# Patient Record
Sex: FEMALE | Race: WHITE | NOT HISPANIC OR LATINO | Employment: FULL TIME | URBAN - METROPOLITAN AREA
[De-identification: names, ages, dates, MRNs, and addresses within clinical notes are randomized per-mention and may not be internally consistent; named-entity substitution may affect disease eponyms.]

---

## 2017-08-03 ENCOUNTER — HOSPITAL ENCOUNTER (OUTPATIENT)
Dept: FAMILY MEDICINE CLINIC | Facility: CLINIC | Age: 22
Setting detail: SPECIMEN
Discharge: HOME | End: 2017-08-03
Attending: FAMILY MEDICINE | Admitting: FAMILY MEDICINE

## 2017-08-03 LAB
ALBUMIN SERPL-MCNC: 4.2 G/DL (ref 3.5–4.8)
ALBUMIN/GLOB SERPL: 1.4 {RATIO} (ref 1–1.7)
ALP SERPL-CCNC: 51 IU/L (ref 32–91)
ALT SERPL-CCNC: 20 IU/L (ref 14–54)
ANION GAP SERPL CALC-SCNC: 13.7 MMOL/L (ref 10–20)
AST SERPL-CCNC: 19 IU/L (ref 15–41)
BACTERIA SPEC AEROBE CULT: NORMAL
BASOPHILS # BLD AUTO: 0.1 10*3/UL (ref 0–0.2)
BASOPHILS NFR BLD AUTO: 1 % (ref 0–2)
BILIRUB SERPL-MCNC: 0.7 MG/DL (ref 0.3–1.2)
BILIRUB UR QL STRIP: NEGATIVE MG/DL
BUN SERPL-MCNC: 13 MG/DL (ref 8–20)
BUN/CREAT SERPL: 14.4 (ref 5.4–26.2)
CALCIUM SERPL-MCNC: 9.5 MG/DL (ref 8.9–10.3)
CASTS URNS QL MICRO: ABNORMAL /[LPF]
CHLORIDE SERPL-SCNC: 105 MMOL/L (ref 101–111)
COLOR UR: YELLOW
CONV BACTERIA IN URINE MICRO: ABNORMAL
CONV CLARITY OF URINE: ABNORMAL
CONV CO2: 23 MMOL/L (ref 22–32)
CONV HYALINE CASTS IN URINE MICRO: 2 /[LPF] (ref 0–5)
CONV PROTEIN IN URINE BY AUTOMATED TEST STRIP: NEGATIVE MG/DL
CONV SMALL ROUND CELLS: ABNORMAL /[HPF]
CONV TOTAL PROTEIN: 7.3 G/DL (ref 6.1–7.9)
CONV UROBILINOGEN IN URINE BY AUTOMATED TEST STRIP: 0.2 MG/DL
CREAT UR-MCNC: 0.9 MG/DL (ref 0.4–1)
CULTURE INDICATED?: ABNORMAL
DIFFERENTIAL METHOD BLD: (no result)
EOSINOPHIL # BLD AUTO: 0.2 10*3/UL (ref 0–0.3)
EOSINOPHIL # BLD AUTO: 2 % (ref 0–3)
ERYTHROCYTE [DISTWIDTH] IN BLOOD BY AUTOMATED COUNT: 13.7 % (ref 11.5–14.5)
GLOBULIN UR ELPH-MCNC: 3.1 G/DL (ref 2.5–3.8)
GLUCOSE SERPL-MCNC: 86 MG/DL (ref 65–99)
GLUCOSE UR QL: NEGATIVE MG/DL
HCT VFR BLD AUTO: 40.3 % (ref 35–49)
HGB BLD-MCNC: 13.5 G/DL (ref 12–15)
HGB UR QL STRIP: NEGATIVE
KETONES UR QL STRIP: NEGATIVE MG/DL
LEUKOCYTE ESTERASE UR QL STRIP: NEGATIVE
LYMPHOCYTES # BLD AUTO: 1.8 10*3/UL (ref 0.8–4.8)
LYMPHOCYTES NFR BLD AUTO: 25 % (ref 18–42)
Lab: NORMAL
MCH RBC QN AUTO: 29.7 PG (ref 26–32)
MCHC RBC AUTO-ENTMCNC: 33.6 G/DL (ref 32–36)
MCV RBC AUTO: 88.5 FL (ref 80–94)
MICRO REPORT STATUS: NORMAL
MONOCYTES # BLD AUTO: 0.8 10*3/UL (ref 0.1–1.3)
MONOCYTES NFR BLD AUTO: 10 % (ref 2–11)
NEUTROPHILS # BLD AUTO: 4.6 10*3/UL (ref 2.3–8.6)
NEUTROPHILS NFR BLD AUTO: 62 % (ref 50–75)
NITRITE UR QL STRIP: NEGATIVE
NRBC BLD AUTO-RTO: 0 /100{WBCS}
NRBC/RBC NFR BLD MANUAL: 0 10*3/UL
PH UR STRIP.AUTO: 6 [PH] (ref 4.5–8)
PLATELET # BLD AUTO: 238 10*3/UL (ref 150–450)
PMV BLD AUTO: 9.6 FL (ref 7.4–10.4)
POTASSIUM SERPL-SCNC: 3.7 MMOL/L (ref 3.6–5.1)
RBC # BLD AUTO: 4.55 10*6/UL (ref 4–5.4)
RBC #/AREA URNS HPF: 2 /[HPF] (ref 0–3)
SODIUM SERPL-SCNC: 138 MMOL/L (ref 136–144)
SP GR UR: 1.03 (ref 1–1.03)
SPECIMEN SOURCE: NORMAL
SPERM URNS QL MICRO: ABNORMAL /[HPF]
SQUAMOUS SPT QL MICRO: 2 /[HPF] (ref 0–5)
TSH SERPL-ACNC: 3.56 UIU/ML (ref 0.34–5.6)
UNIDENT CRYS URNS QL MICRO: ABNORMAL /[HPF]
WBC # BLD AUTO: 7.4 10*3/UL (ref 4.5–11.5)
WBC #/AREA URNS HPF: 2 /[HPF] (ref 0–5)
YEAST SPEC QL WET PREP: ABNORMAL /[HPF]

## 2019-05-10 ENCOUNTER — HOSPITAL ENCOUNTER (OUTPATIENT)
Dept: FAMILY MEDICINE CLINIC | Facility: CLINIC | Age: 24
Setting detail: SPECIMEN
Discharge: HOME | End: 2019-05-10
Attending: FAMILY MEDICINE | Admitting: FAMILY MEDICINE

## 2019-05-10 LAB
ALBUMIN SERPL-MCNC: 4 G/DL (ref 3.5–4.8)
ALBUMIN/GLOB SERPL: 1.2 {RATIO} (ref 1–1.7)
ALP SERPL-CCNC: 50 IU/L (ref 32–91)
ALT SERPL-CCNC: 22 IU/L (ref 14–54)
AMPICILLIN SUSC ISLT: NORMAL
ANION GAP SERPL CALC-SCNC: 12.8 MMOL/L (ref 10–20)
AST SERPL-CCNC: 21 IU/L (ref 15–41)
AZTREONAM SUSC ISLT: NORMAL
BACTERIA ISLT: NORMAL
BACTERIA SPEC AEROBE CULT: NORMAL
BASOPHILS # BLD AUTO: 0 10*3/UL (ref 0–0.2)
BASOPHILS NFR BLD AUTO: 1 % (ref 0–2)
BILIRUB SERPL-MCNC: 0.7 MG/DL (ref 0.3–1.2)
BILIRUB UR QL STRIP: NEGATIVE MG/DL
BUN SERPL-MCNC: 9 MG/DL (ref 8–20)
BUN/CREAT SERPL: 11.3 (ref 5.4–26.2)
CALCIUM SERPL-MCNC: 9 MG/DL (ref 8.9–10.3)
CASTS URNS QL MICRO: ABNORMAL /[LPF]
CEFAZOLIN SUSC ISLT: NORMAL
CEFEPIME SUSC ISLT: NORMAL
CEFTAZIDIME SUSC ISLT: NORMAL
CEFTRIAXONE SUSC ISLT: NORMAL
CHLORIDE SERPL-SCNC: 107 MMOL/L (ref 101–111)
CHOLEST SERPL-MCNC: 139 MG/DL
CHOLEST/HDLC SERPL: 5.2 {RATIO}
CIPROFLOXACIN SUSC ISLT: NORMAL
COLONY COUNT: NORMAL
COLOR UR: YELLOW
CONV BACTERIA IN URINE MICRO: ABNORMAL
CONV CLARITY OF URINE: ABNORMAL
CONV CO2: 22 MMOL/L (ref 22–32)
CONV HYALINE CASTS IN URINE MICRO: ABNORMAL /[LPF] (ref 0–5)
CONV LDL CHOLESTEROL DIRECT: 103 MG/DL (ref 0–100)
CONV PROTEIN IN URINE BY AUTOMATED TEST STRIP: NEGATIVE MG/DL
CONV SMALL ROUND CELLS: ABNORMAL /[HPF]
CONV TOTAL PROTEIN: 7.4 G/DL (ref 6.1–7.9)
CONV UROBILINOGEN IN URINE BY AUTOMATED TEST STRIP: 0.2 MG/DL
CREAT UR-MCNC: 0.8 MG/DL (ref 0.4–1)
CULTURE INDICATED?: ABNORMAL
DIFFERENTIAL METHOD BLD: (no result)
EOSINOPHIL # BLD AUTO: 0.1 10*3/UL (ref 0–0.3)
EOSINOPHIL # BLD AUTO: 2 % (ref 0–3)
ERTAPENEM SUSC ISLT: NORMAL
ERYTHROCYTE [DISTWIDTH] IN BLOOD BY AUTOMATED COUNT: 14.2 % (ref 11.5–14.5)
ERYTHROCYTE [SEDIMENTATION RATE] IN BLOOD BY WESTERGREN METHOD: 13 MM/HR (ref 0–20)
GLOBULIN UR ELPH-MCNC: 3.4 G/DL (ref 2.5–3.8)
GLUCOSE SERPL-MCNC: 85 MG/DL (ref 65–99)
GLUCOSE UR QL: NEGATIVE MG/DL
HCT VFR BLD AUTO: 40.8 % (ref 35–49)
HDLC SERPL-MCNC: 27 MG/DL
HGB BLD-MCNC: 13.7 G/DL (ref 12–15)
HGB UR QL STRIP: ABNORMAL
KETONES UR QL STRIP: NEGATIVE MG/DL
LDLC/HDLC SERPL: 3.8 {RATIO}
LEUKOCYTE ESTERASE UR QL STRIP: NEGATIVE
LEVOFLOXACIN SUSC ISLT: NORMAL
LIPID INTERPRETATION: ABNORMAL
LYMPHOCYTES # BLD AUTO: 1.3 10*3/UL (ref 0.8–4.8)
LYMPHOCYTES NFR BLD AUTO: 23 % (ref 18–42)
Lab: NORMAL
MCH RBC QN AUTO: 29.4 PG (ref 26–32)
MCHC RBC AUTO-ENTMCNC: 33.4 G/DL (ref 32–36)
MCV RBC AUTO: 87.9 FL (ref 80–94)
MEROPENEM SUSC ISLT: NORMAL
MICRO REPORT STATUS: NORMAL
MONOCYTES # BLD AUTO: 0.5 10*3/UL (ref 0.1–1.3)
MONOCYTES NFR BLD AUTO: 10 % (ref 2–11)
NEUTROPHILS # BLD AUTO: 3.7 10*3/UL (ref 2.3–8.6)
NEUTROPHILS NFR BLD AUTO: 64 % (ref 50–75)
NITRITE UR QL STRIP: POSITIVE
NITROFURANTOIN SUSC ISLT: NORMAL
NRBC BLD AUTO-RTO: 0 /100{WBCS}
NRBC/RBC NFR BLD MANUAL: 0 10*3/UL
PH UR STRIP.AUTO: 6 [PH] (ref 4.5–8)
PIP+TAZO SUSC ISLT: NORMAL
PLATELET # BLD AUTO: 276 10*3/UL (ref 150–450)
PMV BLD AUTO: 9.2 FL (ref 7.4–10.4)
POTASSIUM SERPL-SCNC: 3.8 MMOL/L (ref 3.6–5.1)
RBC # BLD AUTO: 4.65 10*6/UL (ref 4–5.4)
RBC #/AREA URNS HPF: 6 /[HPF] (ref 0–3)
SODIUM SERPL-SCNC: 138 MMOL/L (ref 136–144)
SP GR UR: 1.03 (ref 1–1.03)
SPECIMEN SOURCE: NORMAL
SPERM URNS QL MICRO: ABNORMAL /[HPF]
SQUAMOUS SPT QL MICRO: 25 /[HPF] (ref 0–5)
SUSC METH SPEC: NORMAL
TETRACYCLINE SUSC ISLT: NORMAL
TOBRAMYCIN SUSC ISLT: NORMAL
TRIGL SERPL-MCNC: 106 MG/DL
TRIMETHOPRIM/SULFA: NORMAL
TSH SERPL-ACNC: 3.15 UIU/ML (ref 0.34–5.6)
UNIDENT CRYS URNS QL MICRO: ABNORMAL /[HPF]
VIT B12 SERPL-MCNC: 220 PG/ML (ref 180–914)
VLDLC SERPL CALC-MCNC: 9.2 MG/DL
WBC # BLD AUTO: 5.7 10*3/UL (ref 4.5–11.5)
WBC #/AREA URNS HPF: 10 /[HPF] (ref 0–5)
YEAST SPEC QL WET PREP: ABNORMAL /[HPF]

## 2019-05-13 LAB
ANA SER QL IA: NEGATIVE
HBA1C MFR BLD: 4.5 % (ref 0–5.6)

## 2019-06-26 RX ORDER — ALPRAZOLAM 2 MG/1
TABLET ORAL
Qty: 60 TABLET | Refills: 0 | Status: SHIPPED | OUTPATIENT
Start: 2019-06-26 | End: 2019-07-30 | Stop reason: SDUPTHER

## 2019-07-02 ENCOUNTER — TELEPHONE (OUTPATIENT)
Dept: FAMILY MEDICINE CLINIC | Facility: CLINIC | Age: 24
End: 2019-07-02

## 2019-07-02 NOTE — TELEPHONE ENCOUNTER
Patient left  stating that she had nerve test done last Friday, and is asking what the next step should be.

## 2019-07-05 ENCOUNTER — TELEPHONE (OUTPATIENT)
Dept: FAMILY MEDICINE CLINIC | Facility: CLINIC | Age: 24
End: 2019-07-05

## 2019-07-08 ENCOUNTER — TELEPHONE (OUTPATIENT)
Dept: FAMILY MEDICINE CLINIC | Facility: CLINIC | Age: 24
End: 2019-07-08

## 2019-07-08 NOTE — TELEPHONE ENCOUNTER
Patient called stating that she is needing cortisone shots in both hands. When you like for me to schedule. Is 7/19 @ 2:00 ok? Please advise

## 2019-07-19 ENCOUNTER — OFFICE VISIT (OUTPATIENT)
Dept: FAMILY MEDICINE CLINIC | Facility: CLINIC | Age: 24
End: 2019-07-19

## 2019-07-19 VITALS
WEIGHT: 293 LBS | BODY MASS INDEX: 53.92 KG/M2 | DIASTOLIC BLOOD PRESSURE: 108 MMHG | TEMPERATURE: 98.3 F | HEIGHT: 62 IN | SYSTOLIC BLOOD PRESSURE: 138 MMHG | HEART RATE: 75 BPM

## 2019-07-19 DIAGNOSIS — G56.02 CARPAL TUNNEL SYNDROME OF LEFT WRIST: ICD-10-CM

## 2019-07-19 DIAGNOSIS — G56.03 CARPAL TUNNEL SYNDROME, BILATERAL: Primary | ICD-10-CM

## 2019-07-19 DIAGNOSIS — G56.01 CARPAL TUNNEL SYNDROME ON RIGHT: ICD-10-CM

## 2019-07-19 PROCEDURE — 20526 THER INJECTION CARP TUNNEL: CPT | Performed by: FAMILY MEDICINE

## 2019-07-19 RX ORDER — ALPRAZOLAM 2 MG/1
2 TABLET ORAL EVERY 12 HOURS
COMMUNITY
Start: 2018-10-01 | End: 2019-10-25

## 2019-07-19 RX ORDER — METHYLPREDNISOLONE ACETATE 80 MG/ML
20 INJECTION, SUSPENSION INTRA-ARTICULAR; INTRALESIONAL; INTRAMUSCULAR; SOFT TISSUE ONCE
Status: COMPLETED | OUTPATIENT
Start: 2019-07-19 | End: 2019-07-19

## 2019-07-19 RX ORDER — LIDOCAINE HYDROCHLORIDE 10 MG/ML
1 INJECTION, SOLUTION INFILTRATION; PERINEURAL ONCE
Status: COMPLETED | OUTPATIENT
Start: 2019-07-19 | End: 2019-07-19

## 2019-07-19 RX ORDER — ALBUTEROL SULFATE 90 UG/1
AEROSOL, METERED RESPIRATORY (INHALATION)
COMMUNITY
Start: 2015-06-11 | End: 2020-09-15

## 2019-07-19 RX ORDER — METHYLPREDNISOLONE ACETATE 40 MG/ML
20 INJECTION, SUSPENSION INTRA-ARTICULAR; INTRALESIONAL; INTRAMUSCULAR; SOFT TISSUE ONCE
Status: COMPLETED | OUTPATIENT
Start: 2019-07-19 | End: 2019-07-19

## 2019-07-19 RX ORDER — PHENTERMINE HYDROCHLORIDE 15 MG/1
30 CAPSULE ORAL EVERY MORNING
COMMUNITY
End: 2020-11-20

## 2019-07-19 RX ADMIN — LIDOCAINE HYDROCHLORIDE 1 ML: 10 INJECTION, SOLUTION INFILTRATION; PERINEURAL at 15:27

## 2019-07-19 RX ADMIN — METHYLPREDNISOLONE ACETATE 20 MG: 40 INJECTION, SUSPENSION INTRA-ARTICULAR; INTRALESIONAL; INTRAMUSCULAR; SOFT TISSUE at 14:50

## 2019-07-19 RX ADMIN — METHYLPREDNISOLONE ACETATE 20 MG: 40 INJECTION, SUSPENSION INTRA-ARTICULAR; INTRALESIONAL; INTRAMUSCULAR; SOFT TISSUE at 15:38

## 2019-07-19 RX ADMIN — LIDOCAINE HYDROCHLORIDE 1 ML: 10 INJECTION, SOLUTION INFILTRATION; PERINEURAL at 15:26

## 2019-07-19 RX ADMIN — METHYLPREDNISOLONE ACETATE 20 MG: 80 INJECTION, SUSPENSION INTRA-ARTICULAR; INTRALESIONAL; INTRAMUSCULAR; SOFT TISSUE at 15:30

## 2019-07-19 NOTE — PROGRESS NOTES
"Rooming Tab(CC,VS,Pt Hx,Fall Screen)  Chief Complaint   Patient presents with   • cortisone injections     both hands       Subjective   Bilateral carpal tunnel.  Verified by [facility].  Here today to try injections.           I have reviewed and updated her medications, medical history and problem list during today's office visit.     Patient Care Team:  Peyman Moulton MD as PCP - General  Provider, No Known as PCP - Family Medicine    Problem List Tab  Medications Tab  Synopsis Tab  Chart Review Tab  Care Everywhere Tab  Immunizations Tab  Patient History Tab    Social History     Tobacco Use   • Smoking status: Never Smoker   • Smokeless tobacco: Never Used   Substance Use Topics   • Alcohol use: No     Frequency: Never       Review of Systems   Constitutional: Negative.  Negative for appetite change, diaphoresis, fatigue, fever and unexpected weight loss.   HENT: Negative for congestion, sinus pressure and sore throat.    Eyes: Negative for blurred vision, double vision and itching.   Respiratory: Negative for cough and shortness of breath.    Cardiovascular: Negative for chest pain and palpitations.   Genitourinary: Negative for urinary incontinence, difficulty urinating and frequency.   Musculoskeletal: Positive for arthralgias and myalgias. Negative for back pain, joint swelling and neck pain.   Skin: Negative for dry skin and rash.   Allergic/Immunologic: Negative for environmental allergies.   Neurological: Positive for numbness. Negative for dizziness, tremors, syncope, headache and memory problem.   Hematological: Does not bruise/bleed easily.   Psychiatric/Behavioral: Negative for depressed mood. The patient is not nervous/anxious.    All other systems reviewed and are negative.      Objective     Rooming Tab(CC,VS,Pt Hx,Fall Screen)  BP (!) 138/108 (BP Location: Right arm, Patient Position: Sitting, Cuff Size: Adult)   Pulse 75   Temp 98.3 °F (36.8 °C) (Oral)   Ht 157.5 cm (62\")   Wt (!) 140 kg " (309 lb 6.4 oz)   LMP 06/30/2019   BMI 56.59 kg/m²     Body mass index is 56.59 kg/m².    Physical Exam   Constitutional: She appears well-developed and well-nourished.   HENT:   Right Ear: External ear normal.   Left Ear: External ear normal.   Nose: Nose normal.   Mouth/Throat: Oropharynx is clear and moist. No oropharyngeal exudate.   Eyes: Conjunctivae and EOM are normal. Pupils are equal, round, and reactive to light. Right eye exhibits no discharge. Left eye exhibits no discharge. No scleral icterus.   Neck: Normal range of motion. Neck supple. No JVD present. No thyromegaly present.   Cardiovascular: Normal rate, regular rhythm, normal heart sounds and intact distal pulses.   No murmur heard.  Pulmonary/Chest: Breath sounds normal. No respiratory distress. She has no wheezes. She has no rales. She exhibits no tenderness.   Abdominal: Soft. Bowel sounds are normal. She exhibits no distension. There is no tenderness.   Genitourinary: Rectal exam shows guaiac negative stool.   Musculoskeletal: Normal range of motion. She exhibits no edema, tenderness or deformity.   Carpal tunnel syndrome bilat   Lymphadenopathy:     She has no cervical adenopathy.        Statin Choice Calculator  Data Reviewed:  Arthrocentesis  Date/Time: 7/19/2019 2:50 PM  Performed by: Peyman Moulton MD  Authorized by: Peyman Moulton MD   Indications: pain   Body area: wrist  Joint: right wrist  Local anesthesia used: yes  Anesthesia: local infiltration    Anesthesia:  Local anesthesia used: yes  Local Anesthetic: lidocaine 1% without epinephrine    Sedation:  Patient sedated: no    Preparation: Patient was prepped and draped in the usual sterile fashion.  Needle size: 22 G  Ultrasound guidance: no  Approach: anterior  Patient tolerance: Patient tolerated the procedure well with no immediate complications  Comments: Injected the carpal tunnel with 20mg of depomedrol.      Arthrocentesis  Date/Time: 7/23/2019 9:23 PM  Performed by:  Peyman Moulton MD  Authorized by: Peyman Moulton MD   Indications: pain   Body area: wrist  Joint: left wrist  Local anesthesia used: yes  Anesthesia: local infiltration    Anesthesia:  Local anesthesia used: yes  Local Anesthetic: lidocaine 1% without epinephrine    Sedation:  Patient sedated: no    Preparation: Patient was prepped and draped in the usual sterile fashion.  Needle size: 22 G  Ultrasound guidance: no  Approach: anterior                   Lab Results   Component Value Date    BUN 9 05/10/2019    CREATININE 0.8 05/10/2019     05/10/2019    K 3.8 05/10/2019     05/10/2019    CALCIUM 9.0 05/10/2019    ALBUMIN 4.0 05/10/2019    BILITOT 0.7 05/10/2019    ALKPHOS 50 05/10/2019    AST 21 05/10/2019    ALT 22 05/10/2019    TRIG 106 05/10/2019    HDL 27 (L) 05/10/2019     (H) 05/10/2019    LDLHDL 3.8 05/10/2019    WBC 5.7 05/10/2019    RBC 4.65 05/10/2019    HCT 40.8 05/10/2019    MCV 87.9 05/10/2019    MCH 29.4 05/10/2019    TSH 3.15 05/10/2019      Assessment/Plan   Order Review Tab  Health Maintenance Tab  Patient Plan/Order Tab  Diagnoses and all orders for this visit:    1. Carpal tunnel syndrome, bilateral (Primary)  Assessment & Plan:  Injected both wrists with 40mg of depomedrol    Orders:  -     Arthrocentesis  -     methylPREDNISolone acetate (DEPO-medrol) injection 20 mg  -     methylPREDNISolone acetate (DEPO-medrol) injection 20 mg  -     lidocaine (XYLOCAINE) 1 % injection 1 mL  -     lidocaine (XYLOCAINE) 1 % injection 1 mL  -     Arthrocentesis    2. Carpal tunnel syndrome on right  -     Arthrocentesis  -     Arthrocentesis    3. Carpal tunnel syndrome of left wrist  -     Arthrocentesis  -     Arthrocentesis      Wrapup Tab  Return in about 4 months (around 11/19/2019).

## 2019-07-20 PROBLEM — E11.9 TYPE 2 DIABETES MELLITUS (HCC): Status: ACTIVE | Noted: 2019-07-20

## 2019-07-20 PROBLEM — G56.03 CARPAL TUNNEL SYNDROME, BILATERAL UPPER LIMBS: Status: ACTIVE | Noted: 2019-05-10

## 2019-07-20 PROBLEM — G56.02 CARPAL TUNNEL SYNDROME OF LEFT WRIST: Status: ACTIVE | Noted: 2019-07-20

## 2019-07-23 RX ADMIN — METHYLPREDNISOLONE ACETATE 20 MG: 40 INJECTION, SUSPENSION INTRA-ARTICULAR; INTRALESIONAL; INTRAMUSCULAR; SOFT TISSUE at 21:23

## 2019-07-24 RX ORDER — METHYLPREDNISOLONE ACETATE 40 MG/ML
20 INJECTION, SUSPENSION INTRA-ARTICULAR; INTRALESIONAL; INTRAMUSCULAR; SOFT TISSUE
Status: DISCONTINUED | OUTPATIENT
Start: 2019-07-19 | End: 2019-07-24 | Stop reason: HOSPADM

## 2019-07-24 RX ORDER — METHYLPREDNISOLONE ACETATE 40 MG/ML
20 INJECTION, SUSPENSION INTRA-ARTICULAR; INTRALESIONAL; INTRAMUSCULAR; SOFT TISSUE
Status: DISCONTINUED | OUTPATIENT
Start: 2019-07-23 | End: 2019-07-24 | Stop reason: HOSPADM

## 2019-07-26 ENCOUNTER — TELEPHONE (OUTPATIENT)
Dept: FAMILY MEDICINE CLINIC | Facility: CLINIC | Age: 24
End: 2019-07-26

## 2019-07-26 RX ORDER — GABAPENTIN 300 MG/1
CAPSULE ORAL
Qty: 100 CAPSULE | Refills: 2 | Status: SHIPPED | OUTPATIENT
Start: 2019-07-26 | End: 2019-10-25

## 2019-07-30 RX ORDER — ALPRAZOLAM 2 MG/1
TABLET ORAL
Qty: 60 TABLET | Refills: 0 | Status: SHIPPED | OUTPATIENT
Start: 2019-07-30 | End: 2019-08-23 | Stop reason: SDUPTHER

## 2019-08-06 ENCOUNTER — TELEPHONE (OUTPATIENT)
Dept: FAMILY MEDICINE CLINIC | Facility: CLINIC | Age: 24
End: 2019-08-06

## 2019-08-06 NOTE — TELEPHONE ENCOUNTER
Darlene has gabapentin 300mg      She need to increase the dose to 300mg AM 300mg q afternoon and 900mg at HS  Every 2-3 days add in a 300mg dose to the morning, then the afternoon and then the night dose until she is taking 9 caps or pills a day.    Also schedule her to be seen by Alexsandra for her CTS.   Bilateral.

## 2019-08-06 NOTE — TELEPHONE ENCOUNTER
Patient left vm stating that she has been taking gabapentin that was prescribed for hand. Patient states that it is not helping, and is asking what you would suggest.     Also, patient states that she is scheduled for appt on 8/16. Patient is asking if you are needing to see her or can hand issues just be taken care of over the phone.

## 2019-08-09 DIAGNOSIS — G56.03 BILATERAL CARPAL TUNNEL SYNDROME: Primary | ICD-10-CM

## 2019-08-24 RX ORDER — ALPRAZOLAM 2 MG/1
TABLET ORAL
Qty: 60 TABLET | Refills: 0 | Status: SHIPPED | OUTPATIENT
Start: 2019-08-24 | End: 2019-09-23 | Stop reason: SDUPTHER

## 2019-09-19 ENCOUNTER — TELEPHONE (OUTPATIENT)
Dept: FAMILY MEDICINE CLINIC | Facility: CLINIC | Age: 24
End: 2019-09-19

## 2019-09-19 RX ORDER — GUAIFENESIN AND CODEINE PHOSPHATE 100; 10 MG/5ML; MG/5ML
5 SOLUTION ORAL 4 TIMES DAILY PRN
Qty: 180 ML | Refills: 1 | Status: SHIPPED | OUTPATIENT
Start: 2019-09-19 | End: 2019-10-25

## 2019-09-19 RX ORDER — AZITHROMYCIN 500 MG/1
500 TABLET, FILM COATED ORAL DAILY
Qty: 5 TABLET | Refills: 0 | Status: SHIPPED | OUTPATIENT
Start: 2019-09-19 | End: 2019-09-24

## 2019-09-19 NOTE — TELEPHONE ENCOUNTER
PATIENT LEFT VM @ 8:47 AM WITH COMPLAINT OF COLD LIKE SYMP. PATIENT HAS TRIED MUCINEX, DAYQUIL AND SUDAFED WITH NO RELIEF. PATIENT IS ASKING FOR A ZPAK AND COUGH MEDICATION. PLEASE ADVISE.

## 2019-09-23 RX ORDER — ALPRAZOLAM 2 MG/1
TABLET ORAL
Qty: 60 TABLET | Refills: 0 | Status: SHIPPED | OUTPATIENT
Start: 2019-09-23 | End: 2019-11-04 | Stop reason: SDUPTHER

## 2019-10-15 ENCOUNTER — TELEPHONE (OUTPATIENT)
Dept: FAMILY MEDICINE CLINIC | Facility: CLINIC | Age: 24
End: 2019-10-15

## 2019-10-25 ENCOUNTER — OFFICE VISIT (OUTPATIENT)
Dept: FAMILY MEDICINE CLINIC | Facility: CLINIC | Age: 24
End: 2019-10-25

## 2019-10-25 VITALS
HEART RATE: 87 BPM | WEIGHT: 293 LBS | OXYGEN SATURATION: 99 % | BODY MASS INDEX: 53.92 KG/M2 | SYSTOLIC BLOOD PRESSURE: 130 MMHG | RESPIRATION RATE: 16 BRPM | HEIGHT: 62 IN | TEMPERATURE: 98.5 F | DIASTOLIC BLOOD PRESSURE: 90 MMHG

## 2019-10-25 DIAGNOSIS — R55 SYNCOPE, NON CARDIAC: ICD-10-CM

## 2019-10-25 DIAGNOSIS — E11.649 TYPE 2 DIABETES MELLITUS WITH HYPOGLYCEMIA WITHOUT COMA, WITHOUT LONG-TERM CURRENT USE OF INSULIN (HCC): Primary | ICD-10-CM

## 2019-10-25 DIAGNOSIS — E16.1 HYPERINSULINEMIA: ICD-10-CM

## 2019-10-25 DIAGNOSIS — E66.01 CLASS 3 SEVERE OBESITY DUE TO EXCESS CALORIES WITH SERIOUS COMORBIDITY AND BODY MASS INDEX (BMI) OF 50.0 TO 59.9 IN ADULT (HCC): ICD-10-CM

## 2019-10-25 DIAGNOSIS — I10 ESSENTIAL HYPERTENSION: ICD-10-CM

## 2019-10-25 DIAGNOSIS — E16.1 REACTIVE HYPOGLYCEMIA: ICD-10-CM

## 2019-10-25 PROCEDURE — 99214 OFFICE O/P EST MOD 30 MIN: CPT | Performed by: FAMILY MEDICINE

## 2019-10-25 RX ORDER — LEVOTHYROXINE SODIUM 0.05 MG/1
50 TABLET ORAL DAILY
Refills: 1 | COMMUNITY
Start: 2019-09-12 | End: 2020-05-21

## 2019-11-04 RX ORDER — ALPRAZOLAM 2 MG/1
TABLET ORAL
Qty: 60 TABLET | Refills: 0 | Status: SHIPPED | OUTPATIENT
Start: 2019-11-04 | End: 2019-12-16 | Stop reason: SDUPTHER

## 2019-12-16 RX ORDER — ALPRAZOLAM 2 MG/1
TABLET ORAL
Qty: 60 TABLET | Refills: 3 | Status: SHIPPED | OUTPATIENT
Start: 2019-12-16 | End: 2020-04-15

## 2020-02-23 PROBLEM — I10 HTN (HYPERTENSION): Status: ACTIVE | Noted: 2020-02-23

## 2020-02-23 PROBLEM — E16.1 REACTIVE HYPOGLYCEMIA: Status: ACTIVE | Noted: 2020-02-23

## 2020-02-23 PROBLEM — R55 SYNCOPE, NON CARDIAC: Status: ACTIVE | Noted: 2020-02-23

## 2020-02-23 NOTE — ASSESSMENT & PLAN NOTE
Hypertension is unchanged.  Continue current treatment regimen.  Dietary sodium restriction.  Weight loss.  Regular aerobic exercise.  Stop smoking.  Blood pressure will be reassessed in 3 months.    Just like her diabetes I think her hypertension has all been weight related.  Rubi is never needed very much medication to control her blood pressure.  She is currently not on anything and her blood pressure today systolic is in the 130s and diastolic is in the high 80s.  Outside the office I am confident that she is completely normal.  Like all of her medical problems Rubi needs to lose weight and they will probably go away.

## 2020-02-23 NOTE — ASSESSMENT & PLAN NOTE
Currently is massively obese with a BMI of 56.  She is hyperinsulinemic and I think that is the main culprit behind her worrisome symptoms of syncope sweating episodic lethargy and her episodes of changes in mentation.  She needs to get in the habit of eating small frequent high-fiber meals and snacks.  She has to exercise.  Suggested weight watchers.  She may be a candidate for bariatric surgery in the near future

## 2020-02-23 NOTE — PATIENT INSTRUCTIONS
Hypoglycemia  Hypoglycemia occurs when the level of sugar (glucose) in the blood is too low. Hypoglycemia can happen in people who do or do not have diabetes. It can develop quickly, and it can be a medical emergency. For most people with diabetes, a blood glucose level below 70 mg/dL (3.9 mmol/L) is considered hypoglycemia.  Glucose is a type of sugar that provides the body's main source of energy. Certain hormones (insulin and glucagon) control the level of glucose in the blood. Insulin lowers blood glucose, and glucagon raises blood glucose. Hypoglycemia can result from having too much insulin in the bloodstream, or from not eating enough food that contains glucose. You may also have reactive hypoglycemia, which happens within 4 hours after eating a meal.  What are the causes?  Hypoglycemia occurs most often in people who have diabetes and may be caused by:  · Diabetes medicine.  · Not eating enough, or not eating often enough.  · Increased physical activity.  · Drinking alcohol on an empty stomach.  If you do not have diabetes, hypoglycemia may be caused by:  · A tumor in the pancreas.  · Not eating enough, or not eating for long periods at a time (fasting).  · A severe infection or illness.  · Certain medicines.  What increases the risk?  Hypoglycemia is more likely to develop in:  · People who have diabetes and take medicines to lower blood glucose.  · People who abuse alcohol.  · People who have a severe illness.  What are the signs or symptoms?  Mild symptoms  Mild hypoglycemia may not cause any symptoms. If you do have symptoms, they may include:  · Hunger.  · Anxiety.  · Sweating and feeling clammy.  · Dizziness or feeling light-headed.  · Sleepiness.  · Nausea.  · Increased heart rate.  · Headache.  · Blurry vision.  · Irritability.  · Tingling or numbness around the mouth, lips, or tongue.  · A change in coordination.  · Restless sleep.  Moderate symptoms  Moderate hypoglycemia can cause:  · Mental  confusion and poor judgment.  · Behavior changes.  · Weakness.  · Irregular heartbeat.  Severe symptoms  Severe hypoglycemia is a medical emergency. It can cause:  · Fainting.  · Seizures.  · Loss of consciousness (coma).  · Death.  How is this diagnosed?  Hypoglycemia is diagnosed with a blood test to measure your blood glucose level. This blood test is done while you are having symptoms. Your health care provider may also do a physical exam and review your medical history.  How is this treated?  This condition can often be treated by immediately eating or drinking something that contains sugar, such as:  · Fruit juice, 4-6 oz (120-150 mL).  · Regular soda (not diet soda), 4-6 oz (120-150 mL).  · Low-fat milk, 4 oz (120 mL).  · Several pieces of hard candy.  · Sugar or honey, 1 Tbsp (15 mL).  Treating hypoglycemia if you have diabetes  If you are alert and able to swallow safely, follow the 15:15 rule:  · Take 15 grams of a rapid-acting carbohydrate. Talk with your health care provider about how much you should take.  · Rapid-acting options include:  ? Glucose pills (take 15 grams).  ? 6-8 pieces of hard candy.  ? 4-6 oz (120-150 mL) of fruit juice.  ? 4-6 oz (120-150 mL) of regular (not diet) soda.  ? 1 Tbsp (15 mL) honey or sugar.  · Check your blood glucose 15 minutes after you take the carbohydrate.  · If the repeat blood glucose level is still at or below 70 mg/dL (3.9 mmol/L), take 15 grams of a carbohydrate again.  · If your blood glucose level does not increase above 70 mg/dL (3.9 mmol/L) after 3 tries, seek emergency medical care.  · After your blood glucose level returns to normal, eat a meal or a snack within 1 hour.    Treating severe hypoglycemia  Severe hypoglycemia is when your blood glucose level is at or below 54 mg/dL (3 mmol/L). Severe hypoglycemia is a medical emergency. Get medical help right away.  If you have severe hypoglycemia and you cannot eat or drink, you may need an injection of  glucagon. A family member or close friend should learn how to check your blood glucose and how to give you a glucagon injection. Ask your health care provider if you need to have an emergency glucagon injection kit available.  Severe hypoglycemia may need to be treated in a hospital. The treatment may include getting glucose through an IV. You may also need treatment for the cause of your hypoglycemia.  Follow these instructions at home:    General instructions  · Take over-the-counter and prescription medicines only as told by your health care provider.  · Monitor your blood glucose as told by your health care provider.  · Limit alcohol intake to no more than 1 drink a day for nonpregnant women and 2 drinks a day for men. One drink equals 12 oz of beer (355 mL), 5 oz of wine (148 mL), or 1½ oz of hard liquor (44 mL).  · Keep all follow-up visits as told by your health care provider. This is important.  If you have diabetes:  · Always have a rapid-acting carbohydrate snack with you to treat low blood glucose.  · Follow your diabetes management plan as directed. Make sure you:  ? Know the symptoms of hypoglycemia. It is important to treat it right away to prevent it from becoming severe.  ? Take your medicines as directed.  ? Follow your exercise plan.  ? Follow your meal plan. Eat on time, and do not skip meals.  ? Check your blood glucose as often as directed. Always check before and after exercise.  ? Follow your sick day plan whenever you cannot eat or drink normally. Make this plan in advance with your health care provider.  · Share your diabetes management plan with people in your workplace, school, and household.  · Check your urine for ketones when you are ill and as told by your health care provider.  · Carry a medical alert card or wear medical alert jewelry.  Contact a health care provider if:  · You have problems keeping your blood glucose in your target range.  · You have frequent episodes of  hypoglycemia.  Get help right away if:  · You continue to have hypoglycemia symptoms after eating or drinking something containing glucose.  · Your blood glucose is at or below 54 mg/dL (3 mmol/L).  · You have a seizure.  · You faint.  These symptoms may represent a serious problem that is an emergency. Do not wait to see if the symptoms will go away. Get medical help right away. Call your local emergency services (911 in the [place]  Summary  · Hypoglycemia occurs when the level of sugar (glucose) in the blood is too low.  · Hypoglycemia can happen in people who do or do not have diabetes. It can develop quickly, and it can be a medical emergency.  · Make sure you know the symptoms of hypoglycemia and how to treat it.  · Always have a rapid-acting carbohydrate snack with you to treat low blood sugar.  This information is not intended to replace advice given to you by your health care provider. Make sure you discuss any questions you have with your health care provider.  Document Released: 12/18/2006 Document Revised: 06/11/2019 Document Reviewed: 01/20/2017  [employer] Interactive Patient Education © 2020 [employer]

## 2020-02-23 NOTE — ASSESSMENT & PLAN NOTE
I believe she is having noncardiac syncope tied in with her episodes of hypoglycemia secondary to hyperinsulinemia secondary to her obesity.  I am not sure if she has a vasovagal event at the end of this cycle or what exactly happens but her syncope seems to be tied in with what is probably hypoglycemia.  We have talked at great length about how to prevent this with small frequent high fiber low carbohydrate meals and snacks.  We also stressed the importance of losing weight

## 2020-02-23 NOTE — ASSESSMENT & PLAN NOTE
Obesity is worsening.  Discussed the patient's BMI.  The BMI is above average; BMI management plan is completed.  General weight loss/lifestyle modification strategies discussed (elicit support from others; identify saboteurs; non-food rewards, etc).  Behavioral treatment: commercial programs (Weight watchers discussed).  Diet interventions: low calorie (1000 kCal/d) deficit diet.  Informal exercise measures discussed, e.g. taking stairs instead of elevator.  Regular aerobic exercise program discussed.     Currently has a BMI of 56.52.  She has to get her weight down if she wants to live a reasonably normal life.  I have suggested she look into the weight watchers program since is been very successful recently and would fit her lifestyle.  If it is not possible for her to do this in the next few years we need to consider bariatric surgery.  I really think her medical concerns with syncope and weakness sweating are all probably related to hypoglycemia from being hyperinsulinemic.  She was diabetic but I am not sure that she really is right now.  Her last A1c was 5.4%.  I think what she has risk of is hypoglycemia.  That is what is causing her trouble.  She needs to eat low calorie high fiber snacks low in carbs throughout the day.  She can fast basically overnight but as soon as she wakes up I think it would be wise for her to have some apples and may be a slight amount of peanut butter to keep her blood sugars elevated out of the symptomatic range.  During the day she needs to eat small portions frequent meals.

## 2020-02-23 NOTE — ASSESSMENT & PLAN NOTE
This is what I think is triggering her syncope.  I think she will eat a meal or snack that has carbohydrates in it and that initiates a hyperinsulinemic state which in turn pushes her sugars down.  Sometimes this happens to such a degree that she gets very symptomatic sweats, very weak, and occasionally she will have syncope.   The whole idea is to eat small frequent high-fiber low carbohydrate snacks and meals and to lose weight as soon as possible.

## 2020-03-09 ENCOUNTER — TELEPHONE (OUTPATIENT)
Dept: FAMILY MEDICINE CLINIC | Facility: CLINIC | Age: 25
End: 2020-03-09

## 2020-03-09 NOTE — TELEPHONE ENCOUNTER
Pt stated she had surgery on her left hand on Wednesday 03/04/2020. Pt stated she was only given a 3 day RX for medication and was requesting Dr. Moulton send a RX to [facility] for pain. Pt also states she has been alternating Acetaminophen and ibuprofen 800mg yet the pain is still there. Pt would like a call back when RX is sent to [facility].         Pt can be contacted at [phone]      Pt requests a VM be left if no answer.

## 2020-03-10 NOTE — TELEPHONE ENCOUNTER
Tell Rubi that I am sorry but the surgeon has to be the one to give her pain medicine postoperatively.  If her pain is that bad she needs to be going to see the surgeon to find out what the problem is.  I cannot be giving her pain medicine for this

## 2020-03-25 ENCOUNTER — TELEPHONE (OUTPATIENT)
Dept: FAMILY MEDICINE CLINIC | Facility: CLINIC | Age: 25
End: 2020-03-25

## 2020-03-25 NOTE — TELEPHONE ENCOUNTER
HUB - PLEASE INFORM PATIENT.  This would need to be done by the surgeon.     Left message 3/25 11:27 am for pt to call back.

## 2020-03-25 NOTE — TELEPHONE ENCOUNTER
Patient called in stating she needs to have [facility] paper work filled out, she had surgery on her hand and doesn't believe she's ready to go back to work.    Please call to advise    Patient call back [phone]

## 2020-04-15 DIAGNOSIS — F41.1 GENERALIZED ANXIETY DISORDER: Primary | ICD-10-CM

## 2020-04-15 RX ORDER — ALPRAZOLAM 2 MG/1
TABLET ORAL
Qty: 60 TABLET | Refills: 3 | Status: SHIPPED | OUTPATIENT
Start: 2020-04-15 | End: 2020-08-13

## 2020-04-19 NOTE — ASSESSMENT & PLAN NOTE
Diabetes is improving with lifestyle modifications.   Continue current treatment regimen.  Reminded to bring in blood sugar diary at next visit.  Dietary recommendations for ADA diet.  Regular aerobic exercise.  Discussed ways to avoid symptomatic hypoglycemia.  Diabetes will be reassessed in 3 months.    Patient has actually had reasonably good control of her blood sugars for the past year.  Her last A1c was 5.4%.  She is not taking any medication she is doing this basically with diet.  What is happening I believe is that she is hyperinsulinemic and she eats sporadically.  In particular when she eats high carbohydrate foods she becomes hypoglycemic shortly thereafter.  This in turn has led several times to syncope.  She has to learn how to eat small frequent high-fiber low carbohydrate meals and snacks.  Her overall calorie count has to be low somewhere around 2000 avel a day.  I suggested that she consider weight watchers program.  In the long run though I think bariatric surgery is probably going to be her best option.   baseline

## 2020-04-23 ENCOUNTER — TELEPHONE (OUTPATIENT)
Dept: FAMILY MEDICINE CLINIC | Facility: CLINIC | Age: 25
End: 2020-04-23

## 2020-04-23 NOTE — TELEPHONE ENCOUNTER
No I cant do that.   They both need to go to work and its the responsibility of the employer to protect them.  If they have a union they need to talk with the union reps.   If no union they need to speak with human resource department.   If they dont help they need to call the state and ask for help.

## 2020-04-23 NOTE — TELEPHONE ENCOUNTER
Patient and her boyfriend, Chris Sandhu, both work for [employer]. There are confirmed cases of COVID-19 at their place of employment. Patient carries diagnosis of diabetes and asthma. Boyfriend lives with her. She is asking if you would authorize a leave from work for her boyfriend, who is not a patient here, because he lives with someone who has diabetes and asthma. Boyfriend's  [birth year]10[birth year]1991. Thank you.

## 2020-05-19 ENCOUNTER — TELEPHONE (OUTPATIENT)
Dept: FAMILY MEDICINE CLINIC | Facility: CLINIC | Age: 25
End: 2020-05-19

## 2020-05-19 NOTE — TELEPHONE ENCOUNTER
PATIENT CALLING BACK TO SPEAK TO GUERRERO.    I WAS UNABLE TO REACH ANYONE IN THE OFFICE.    PATIENT CAN BE REACHED -[phone]

## 2020-05-19 NOTE — TELEPHONE ENCOUNTER
I will have to use a Telehealth appointment for this and can't schedule unless I have PMJ's OK to schedule. Looking at 5/21/20 2:30 pm. Please advise.

## 2020-05-19 NOTE — TELEPHONE ENCOUNTER
Scheduled appointment 5/21/20 10:00 am PMJ (30 minutes). Pt notified of appointment date and time.

## 2020-05-21 ENCOUNTER — TELEPHONE (OUTPATIENT)
Dept: FAMILY MEDICINE CLINIC | Facility: CLINIC | Age: 25
End: 2020-05-21

## 2020-05-21 ENCOUNTER — OFFICE VISIT (OUTPATIENT)
Dept: FAMILY MEDICINE CLINIC | Facility: CLINIC | Age: 25
End: 2020-05-21

## 2020-05-21 VITALS
RESPIRATION RATE: 16 BRPM | DIASTOLIC BLOOD PRESSURE: 82 MMHG | WEIGHT: 293 LBS | HEIGHT: 62 IN | OXYGEN SATURATION: 98 % | HEART RATE: 74 BPM | SYSTOLIC BLOOD PRESSURE: 118 MMHG | BODY MASS INDEX: 53.92 KG/M2 | TEMPERATURE: 98 F

## 2020-05-21 DIAGNOSIS — E16.1 REACTIVE HYPOGLYCEMIA: Primary | ICD-10-CM

## 2020-05-21 DIAGNOSIS — E11.649 TYPE 2 DIABETES MELLITUS WITH HYPOGLYCEMIA WITHOUT COMA, WITHOUT LONG-TERM CURRENT USE OF INSULIN (HCC): ICD-10-CM

## 2020-05-21 DIAGNOSIS — E16.1 HYPERINSULINEMIA: ICD-10-CM

## 2020-05-21 DIAGNOSIS — E66.01 MORBID OBESITY WITH BMI OF 50.0-59.9, ADULT (HCC): Primary | ICD-10-CM

## 2020-05-21 DIAGNOSIS — E66.01 CLASS 3 SEVERE OBESITY DUE TO EXCESS CALORIES WITH SERIOUS COMORBIDITY AND BODY MASS INDEX (BMI) OF 50.0 TO 59.9 IN ADULT (HCC): ICD-10-CM

## 2020-05-21 PROCEDURE — 99214 OFFICE O/P EST MOD 30 MIN: CPT | Performed by: FAMILY MEDICINE

## 2020-05-21 RX ORDER — LEVOTHYROXINE SODIUM 0.07 MG/1
75 TABLET ORAL DAILY
COMMUNITY
Start: 2020-05-09

## 2020-05-21 NOTE — PROGRESS NOTES
Rooming Tab(CC,VS,Pt Hx,Fall Screen)  Chief Complaint   Patient presents with   • Diabetes       Subjective    Rubi is a 25-year-old white female who suffers with morbid obesity having a BMI of 56.7 today to the office with [place] papers that she needs filled out regarding episodes of what sounds like hypoglycemia.    I have reviewed and updated her medications, medical history and problem list during today's office visit.     Patient Care Team:  Peyman Moulton MD as PCP - General    Problem List Tab  Medications Tab  Synopsis Tab  Chart Review Tab  Care Everywhere Tab  Immunizations Tab  Patient History Tab    Social History     Tobacco Use   • Smoking status: Never Smoker   • Smokeless tobacco: Never Used   Substance Use Topics   • Alcohol use: No     Frequency: Never       Review of Systems   Constitutional: Negative for activity change, appetite change, fatigue, fever and unexpected weight loss.   HENT: Negative for congestion, ear pain, hearing loss, postnasal drip, rhinorrhea, sinus pressure, sneezing, sore throat and swollen glands.    Eyes: Negative for blurred vision.   Respiratory: Negative for cough, shortness of breath and wheezing.    Cardiovascular: Negative for chest pain.   Gastrointestinal: Negative for abdominal pain, nausea and indigestion.   Genitourinary: Negative for dysuria, urgency and urinary incontinence.   Musculoskeletal: Negative for arthralgias, back pain, joint swelling and myalgias.   Skin: Negative for dry skin and skin lesions.   Allergic/Immunologic: Negative for environmental allergies.   Neurological: Negative for dizziness, headache, memory problem and confusion.   Hematological: Negative for adenopathy.   Psychiatric/Behavioral: Negative for agitation, depressed mood and stress. The patient is not nervous/anxious.    All other systems reviewed and are negative.      Objective     Rooming Tab(CC,VS,Pt Hx,Fall Screen)  /82 (BP Location: Right arm, Patient Position:  "Sitting, Cuff Size: Large Adult)   Pulse 74   Temp 98 °F (36.7 °C) (Temporal)   Resp 16   Ht 157.5 cm (62\")   Wt (!) 141 kg (310 lb)   SpO2 98%   BMI 56.70 kg/m²     Body mass index is 56.7 kg/m².    Physical Exam   Constitutional: She is oriented to person, place, and time. She appears well-developed and well-nourished.   HENT:   Head: Normocephalic and atraumatic.   Right Ear: External ear normal.   Left Ear: External ear normal.   Nose: Nose normal.   Mouth/Throat: Oropharynx is clear and moist. No oropharyngeal exudate.   Eyes: Pupils are equal, round, and reactive to light. Conjunctivae and EOM are normal. Right eye exhibits no discharge. Left eye exhibits no discharge. No scleral icterus.   Neck: Normal range of motion. Neck supple. No JVD present. No thyromegaly present.   Cardiovascular: Normal rate, regular rhythm, normal heart sounds and intact distal pulses.   No murmur heard.  Pulmonary/Chest: Effort normal and breath sounds normal. No respiratory distress. She has no wheezes. She has no rales. She exhibits no tenderness.   Abdominal: Soft. Bowel sounds are normal. She exhibits no distension. There is no tenderness.   Genitourinary: Rectal exam shows guaiac negative stool.   Musculoskeletal: Normal range of motion. She exhibits no edema, tenderness or deformity.   Lymphadenopathy:     She has no cervical adenopathy.   Neurological: She is alert and oriented to person, place, and time.   Skin: Skin is warm and dry.   Psychiatric: She has a normal mood and affect. Her behavior is normal. Judgment and thought content normal.   Vitals reviewed.       Statin Choice Calculator  Data Reviewed:                   Assessment/Plan   Order Review Tab  Health Maintenance Tab  Patient Plan/Order Tab  Diagnoses and all orders for this visit:    1. Reactive hypoglycemia (Primary)  Assessment & Plan:  The patient is having profound syncopal spells secondary to reactive hypoglycemia.  To prevent these she needs to " eat fewer calories more often.  They should be low glycemic foods with more protein and fat.  She needs to eat 6-7 small meals a day with total calories amounting to no more than 1200.      2. Hyperinsulinemia  Assessment & Plan:  Patient has hyperinsulinemia and when she either eats the wrong type of food or waits too long between meals she has a tendency to have hypoglycemia that at times has been rather profound and very symptomatic.  Treatment is going to be low glycemic index foods with more protein and fats and less calories but I want her to spread the calories out throughout a 24.  With small frequent snacks.  Low glycemic index foods taken more frequently but overall caloric intake should be no more than 1200 per 24 hours.      3. Type 2 diabetes mellitus with hypoglycemia without coma, without long-term current use of insulin (CMS/[facility])  Assessment & Plan:  Diabetes has resolved.   Patient is on no medication and her A1c is under 5%.  She is certainly at risk for getting diabetes again if she is not careful but she is now well versed in low-carb diet, exercise ,and weight loss.  Hopefully she can maintain this nondiabetic state.  Patient has been erroneously marked as diabetic. Based on the available clinical information, she does not have diabetes and should therefore be excluded from diabetic health maintenance and quality measures for the remainder of the reporting period.            4. Class 3 severe obesity due to excess calories with serious comorbidity and body mass index (BMI) of 50.0 to 59.9 in adult (CMS/[facility])  Assessment & Plan:  Obesity is worsening.  Discussed the patient's BMI.  The BMI is above average; BMI management plan is completed.  General weight loss/lifestyle modification strategies discussed (elicit support from others; identify saboteurs; non-food rewards, etc).  Behavioral treatment: commercial programs (Weight watchers).  Diet interventions: low calorie (1000 kCal/d) deficit  diet.  Informal exercise measures discussed, e.g. taking stairs instead of elevator.  Regular aerobic exercise program discussed.       Patient is a prime candidate for bariatric surgery.  I discussed this with her and I would really like her to go ahead and start the process of at least getting evaluated.  She can always decide not to do it.        Wrapup Tab  Return in about 3 months (around 8/21/2020) for Recheck.

## 2020-05-25 NOTE — ASSESSMENT & PLAN NOTE
Obesity is worsening.  Discussed the patient's BMI.  The BMI is above average; BMI management plan is completed.  General weight loss/lifestyle modification strategies discussed (elicit support from others; identify saboteurs; non-food rewards, etc).  Behavioral treatment: commercial programs (Weight watchers).  Diet interventions: low calorie (1000 kCal/d) deficit diet.  Informal exercise measures discussed, e.g. taking stairs instead of elevator.  Regular aerobic exercise program discussed.       Patient is a prime candidate for bariatric surgery.  I discussed this with her and I would really like her to go ahead and start the process of at least getting evaluated.  She can always decide not to do it.

## 2020-05-25 NOTE — ASSESSMENT & PLAN NOTE
Patient has hyperinsulinemia and when she either eats the wrong type of food or waits too long between meals she has a tendency to have hypoglycemia that at times has been rather profound and very symptomatic.  Treatment is going to be low glycemic index foods with more protein and fats and less calories but I want her to spread the calories out throughout a 24.  With small frequent snacks.  Low glycemic index foods taken more frequently but overall caloric intake should be no more than 1200 per 24 hours.

## 2020-05-25 NOTE — ASSESSMENT & PLAN NOTE
The patient is having profound syncopal spells secondary to reactive hypoglycemia.  To prevent these she needs to eat fewer calories more often.  They should be low glycemic foods with more protein and fat.  She needs to eat 6-7 small meals a day with total calories amounting to no more than 1200.

## 2020-05-25 NOTE — ASSESSMENT & PLAN NOTE
Diabetes has resolved.   Patient is on no medication and her A1c is under 5%.  She is certainly at risk for getting diabetes again if she is not careful but she is now well versed in low-carb diet, exercise ,and weight loss.  Hopefully she can maintain this nondiabetic state.  Patient has been erroneously marked as diabetic. Based on the available clinical information, she does not have diabetes and should therefore be excluded from diabetic health maintenance and quality measures for the remainder of the reporting period.

## 2020-05-25 NOTE — PATIENT INSTRUCTIONS
Preventing Hypoglycemia  Hypoglycemia occurs when the level of sugar (glucose) in the blood is too low. Hypoglycemia can happen in people who do or do not have diabetes (diabetes mellitus). It can develop quickly, and it can be a medical emergency. For most people with diabetes, a blood glucose level below 70 mg/dL (3.9 mmol/L) is considered hypoglycemia.  Glucose is a type of sugar that provides the body's main source of energy. Certain hormones (insulin and glucagon) control the level of glucose in the blood. Insulin lowers blood glucose, and glucagon increases blood glucose. Hypoglycemia can result from having too much insulin in the bloodstream, or from not eating enough food that contains glucose.  Your risk for hypoglycemia is higher:  · If you take insulin or diabetes medicines to help lower your blood glucose or help your body make more insulin.  · If you skip or delay a meal or snack.  · If you are ill.  · During and after exercise.  You can prevent hypoglycemia by working with your health care provider to adjust your meal plan as needed and by taking other precautions.  How can hypoglycemia affect me?  Mild symptoms  Mild hypoglycemia may not cause any symptoms. If you do have symptoms, they may include:  · Hunger.  · Anxiety.  · Sweating and feeling clammy.  · Dizziness or feeling light-headed.  · Sleepiness.  · Nausea.  · Increased heart rate.  · Headache.  · Blurry vision.  · Irritability.  · Tingling or numbness around the mouth, lips, or tongue.  · A change in coordination.  · Restless sleep.  If mild hypoglycemia is not recognized and treated, it can quickly become moderate or severe hypoglycemia.  Moderate symptoms  Moderate hypoglycemia can cause:  · Mental confusion and poor judgment.  · Behavior changes.  · Weakness.  · Irregular heartbeat.  Severe symptoms  Severe hypoglycemia is a medical emergency. It can cause:  · Fainting.  · Seizures.  · Loss of consciousness (coma).  · Death.  What  nutrition changes can be made?  · Work with your health care provider or diet and nutrition specialist (dietitian) to make a healthy meal plan that is right for you. Follow your meal plan carefully.  · Eat meals at regular times.  · If recommended by your health care provider, have snacks between meals.  · Donot skip or delay meals or snacks. You can be at risk for hypoglycemia if you are not getting enough carbohydrates.  What lifestyle changes can be made?    · Work closely with your health care provider to manage your blood glucose. Make sure you know:  ? Your goal blood glucose levels.  ? How and when to check your blood glucose.  ? The symptoms of hypoglycemia. It is important to treat it right away to keep it from becoming severe.  · Do not drink alcohol on an empty stomach.  · When you are ill, check your blood glucose more often than usual. Follow your sick day plan whenever you cannot eat or drink normally. Make this plan in advance with your health care provider.  · Always check your blood glucose before, during, and after exercise.  How is this treated?  This condition can often be treated by immediately eating or drinking something that contains sugar, such as:  · Fruit juice, 4-6 oz (120-150 mL).  · Regular (not diet) soda, 4-6 oz (120-150 mL).  · Low-fat milk, 4 oz (120 mL).  · Several pieces of hard candy.  · Sugar or honey, 1 Tbsp (15 mL).  Treating hypoglycemia if you have diabetes  If you are alert and able to swallow safely, follow the 15:15 rule:  · Take 15 grams of a rapid-acting carbohydrate. Talk with your health care provider about how much you should take.  · Rapid-acting options include:  ? Glucose pills (take 15 grams).  ? 6-8 pieces of hard candy.  ? 4-6 oz (120-150 mL) of fruit juice.  ? 4-6 oz (120-150 mL) of regular (not diet) soda.  · Check your blood glucose 15 minutes after you take the carbohydrate.  · If the repeat blood glucose level is still at or below 70 mg/dL (3.9 mmol/L),  take 15 grams of a carbohydrate again.  · If your blood glucose level does not increase above 70 mg/dL (3.9 mmol/L) after 3 tries, seek emergency medical care.  · After your blood glucose level returns to normal, eat a meal or a snack within 1 hour.  Treating severe hypoglycemia  Severe hypoglycemia is when your blood glucose level is at or below 54 mg/dL (3 mmol/L). Severe hypoglycemia is a medical emergency. Get medical help right away.  If you have severe hypoglycemia and you cannot eat or drink, you may need an injection of glucagon. A family member or close friend should learn how to check your blood glucose and how to give you a glucagon injection. Ask your health care provider if you need to have an emergency glucagon injection kit available.  Severe hypoglycemia may need to be treated in a hospital. The treatment may include getting glucose through an IV. You may also need treatment for the cause of your hypoglycemia.  Where to find more information  · American Diabetes Association: www.diabetes.org  · National [place] of Diabetes and Digestive and Kidney Diseases: www.niddk.nih.gov  Contact a health care provider if:  · You have problems keeping your blood glucose in your target range.  · You have frequent episodes of hypoglycemia.  Get help right away if:  · You continue to have hypoglycemia symptoms after eating or drinking something containing glucose.  · Your blood glucose level is at or below 54 mg/dL (3 mmol/L).  · You faint.  · You have a seizure.  These symptoms may represent a serious problem that is an emergency. Do not wait to see if the symptoms will go away. Get medical help right away. Call your local emergency services (911 in the [place]  Summary  · Know the symptoms of hypoglycemia, and when you are at risk for it (such as during exercise or when you are sick). Check your blood glucose often when you are at risk for hypoglycemia.  · Hypoglycemia can develop quickly, and it can be dangerous  if it is not treated right away. If you have a history of severe hypoglycemia, make sure you know how to use your glucagon injection kit.  · Make sure you know how to treat hypoglycemia. Keep a carbohydrate snack available when you may be at risk for hypoglycemia.  This information is not intended to replace advice given to you by your health care provider. Make sure you discuss any questions you have with your health care provider.  Document Released: 08/15/2018 Document Revised: 04/10/2020 Document Reviewed: 08/15/2018  Elsevier Patient Education © 2020 [employer]

## 2020-08-12 DIAGNOSIS — F41.1 GENERALIZED ANXIETY DISORDER: ICD-10-CM

## 2020-08-13 RX ORDER — ALPRAZOLAM 2 MG/1
TABLET ORAL
Qty: 60 TABLET | Refills: 1 | Status: SHIPPED | OUTPATIENT
Start: 2020-08-13 | End: 2020-10-13

## 2020-09-18 ENCOUNTER — TELEPHONE (OUTPATIENT)
Dept: FAMILY MEDICINE CLINIC | Facility: CLINIC | Age: 25
End: 2020-09-18

## 2020-09-18 RX ORDER — ALBUTEROL SULFATE 90 UG/1
2 AEROSOL, METERED RESPIRATORY (INHALATION) EVERY 4 HOURS PRN
Qty: 8.5 G | Refills: 11 | Status: SHIPPED | OUTPATIENT
Start: 2020-09-18 | End: 2020-10-18

## 2020-09-18 RX ORDER — PREDNISONE 20 MG/1
TABLET ORAL
Qty: 25 TABLET | Refills: 1 | Status: SHIPPED | OUTPATIENT
Start: 2020-09-18

## 2020-09-18 NOTE — TELEPHONE ENCOUNTER
PATIENT IS CALLING IN REGARDS TO BEING SICK FOR A COUPLE WEEKS. PATIENT HAS BEEN GIVEN COVID19 TEST, ANTIBIOTICS, AND A STREP TEST. ALL NEGATIVE. TESTED 8/20 NEGATIVE FOR COVID19    WAS RETESTED 9/16 FOR COVID, WAITING FOR RESULTS    PATIENT SAYS COUGHING AND IS HAVING DIFFICULTY BREATHING.    PATIENT HAS USED ALMOST ALL OF HER INHALER THAT WAS REFILLED.    PATIENT IS NEEDING SOMETHING CALLED IN. SHE WAS GIVEN THE COUGH PEARLS AND IT DID NOT HELP..    PLEASE CALL PATIENT   [phone]

## 2020-09-18 NOTE — TELEPHONE ENCOUNTER
It sounds like her asthma is kicking up probably due to severe fall allergies.  Lets use prednisone to get the entire system to calm down and then Trelegy and albuterol inhalers to try and keep the system calm down.

## 2020-09-22 ENCOUNTER — TELEPHONE (OUTPATIENT)
Dept: FAMILY MEDICINE CLINIC | Facility: CLINIC | Age: 25
End: 2020-09-22

## 2020-09-22 NOTE — TELEPHONE ENCOUNTER
Patient called and stated she needs a PA for Fluticasone-Umeclidin-Vilant 100-62.5-25 MCG/INH aerosol powder     Please advise     [phone]

## 2020-09-23 NOTE — TELEPHONE ENCOUNTER
There are more choices but the problem is I could guess all night long and they just keep saying no.   Darlene needs to find out from her insurance company whats on her formulary they will cover.  Tell her to check under Advair discus 250,  Symbicort 160, Breo Elliptica, Dulera etc etc.    They should tell her what they will cover.

## 2020-09-23 NOTE — TELEPHONE ENCOUNTER
HUB TO SHARE    Left voicemail for her to check her insurance to see what is on formulary.and to let us know

## 2020-10-10 DIAGNOSIS — F41.1 GENERALIZED ANXIETY DISORDER: ICD-10-CM

## 2020-10-13 RX ORDER — ALPRAZOLAM 2 MG/1
TABLET ORAL
Qty: 60 TABLET | Refills: 1 | Status: SHIPPED | OUTPATIENT
Start: 2020-10-13 | End: 2020-12-05

## 2020-11-17 DIAGNOSIS — E66.01 CLASS 3 SEVERE OBESITY DUE TO EXCESS CALORIES WITH SERIOUS COMORBIDITY AND BODY MASS INDEX (BMI) OF 50.0 TO 59.9 IN ADULT (HCC): Primary | ICD-10-CM

## 2020-11-20 RX ORDER — PHENTERMINE HYDROCHLORIDE 37.5 MG/1
TABLET ORAL
Qty: 30 TABLET | Refills: 2 | Status: SHIPPED | OUTPATIENT
Start: 2020-11-20

## 2020-11-28 PROBLEM — E11.9 TYPE 2 DIABETES MELLITUS: Status: RESOLVED | Noted: 2019-07-20 | Resolved: 2020-11-28

## 2020-12-02 DIAGNOSIS — F41.1 GENERALIZED ANXIETY DISORDER: ICD-10-CM

## 2020-12-05 RX ORDER — ALPRAZOLAM 2 MG/1
TABLET ORAL
Qty: 60 TABLET | Refills: 1 | Status: SHIPPED | OUTPATIENT
Start: 2020-12-05 | End: 2021-02-08

## 2021-01-14 ENCOUNTER — TELEPHONE (OUTPATIENT)
Dept: BARIATRICS/WEIGHT MGMT | Facility: CLINIC | Age: 26
End: 2021-01-14

## 2021-01-25 ENCOUNTER — PREP FOR SURGERY (OUTPATIENT)
Dept: OTHER | Facility: HOSPITAL | Age: 26
End: 2021-01-25

## 2021-01-25 RX ORDER — SODIUM CHLORIDE, SODIUM LACTATE, POTASSIUM CHLORIDE, CALCIUM CHLORIDE 600; 310; 30; 20 MG/100ML; MG/100ML; MG/100ML; MG/100ML
30 INJECTION, SOLUTION INTRAVENOUS CONTINUOUS PRN
Status: CANCELLED | OUTPATIENT
Start: 2021-01-25

## 2021-01-26 ENCOUNTER — OFFICE VISIT (OUTPATIENT)
Dept: PSYCHIATRY | Facility: CLINIC | Age: 26
End: 2021-01-26

## 2021-01-26 ENCOUNTER — OFFICE VISIT (OUTPATIENT)
Dept: BARIATRICS/WEIGHT MGMT | Facility: CLINIC | Age: 26
End: 2021-01-26

## 2021-01-26 DIAGNOSIS — E66.01 MORBID OBESITY (HCC): Primary | ICD-10-CM

## 2021-01-26 DIAGNOSIS — Z01.818 PRE-OP EXAMINATION: ICD-10-CM

## 2021-01-26 DIAGNOSIS — Z01.818 PRE-OPERATIVE EXAMINATION: ICD-10-CM

## 2021-01-26 PROCEDURE — 90791 PSYCH DIAGNOSTIC EVALUATION: CPT | Performed by: PSYCHIATRY & NEUROLOGY

## 2021-01-26 PROCEDURE — 99443 PR PHYS/QHP TELEPHONE EVALUATION 21-30 MIN: CPT | Performed by: NURSE PRACTITIONER

## 2021-01-26 NOTE — PROGRESS NOTES
[facility] BAR SUR[facility]  [address] [place] [address]  [address] [place] [address]  Dept: [phone]  1/26/2021      Darlene Mendieta.  [id number]  [id number]  1995  female    You have chosen to receive care through a telephone call. Do you consent to use a telephone call for your medical care today? Yes    Current weight 288 pounds     Chief Complaint of weight gain; unable to maintain weight loss    History of Present Illness:   Darlene is a 26 y.o. female who presents today for evaluation, education and consultation regarding weight loss surgery. The patient is interested in the sleeve gastrectomy.      Diet History:See dietician/RN/MA documentation for complete history of weight and diet.     Bariatric Surgery Evaluation: The patient is being seen for an initial visit for bariatric surgery evaluation.   Works 3 rd shift  Breakfast: eats at 5 pm, bag of cauliflower with cheese and hamburger daniel with cheese   5 am after she gets off work- 2 eggs and 2 pieces of sausage   Snacks: usually none, but sometimes handful of pork rinds   Drinks: unsweet tea, decaf black tea, and diet Dr. 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   Carpal tunnel left wrist and left elbow     No Known Allergies      Current Outpatient  Medications:   •  albuterol sulfate HFA (ProAir HFA) 108 (90 Base) MCG/ACT inhaler, Inhale 2 puffs Every 4 (Four) Hours As Needed for Wheezing for up to 30 days., Disp: 8.5 g, Rfl: 11  •  ALPRAZolam (XANAX) 2 MG tablet, TAKE 1 TABLET BY MOUTH TWICE A DAY, Disp: 60 tablet, Rfl: 1  •  glucose blood (AGAMATRIX PRESTO TEST) test strip, AGAMATRIX PRESTO TEST STRP, Disp: , Rfl:   •  levothyroxine (SYNTHROID, LEVOTHROID) 75 MCG tablet, Take 75 mcg by mouth Daily. on an empty stomach, Disp: , Rfl:   •  phentermine (ADIPEX-P) 37.5 MG tablet, TAKE 1 TABLET BY MOUTH ONCE DAILY *NOT COVERED*, Disp: 30 tablet, Rfl: 2  •  predniSONE (DELTASONE) 20 MG tablet, Take 3 po qd for 4d  then 2 qd for 4d then take 1 qd for 4d  then 1/2 qd for 2d, Disp: 25 tablet, Rfl: 1  Not taking synthroid   No prednisone     Social History     Socioeconomic History   • Marital status: Single     Spouse name: Not on file   • Number of children: Not on file   • Years of education: Not on file   • Highest education level: Not on file   Tobacco Use   • Smoking status: Never Smoker   • Smokeless tobacco: Never Used   Substance and Sexual Activity   • Alcohol use: No     Frequency: Never   • Drug use: No   • Sexual activity: Yes       Family History   Problem Relation Age of Onset   • Cancer Mother    • Other Father    • Multiple sclerosis Father    • Arthritis Maternal Grandmother    • Cancer Maternal Grandfather          Review of Systems:  Review of Systems   Constitutional: Positive for fatigue.   HENT:        Wears glasses/ contacts   Respiratory:        Asthma   Cardiovascular: Negative.    Gastrointestinal: Negative.    Endocrine:        DMII   Genitourinary: Negative.    Musculoskeletal:        Carpal tunnel syndrome   Skin: Negative.    Neurological: Negative.    Hematological: Negative.    Psychiatric/Behavioral:        Anxiety       Physical Exam:  Vital Signs:        Height 62 inches, weight 288 pounds, BMI 52.68              Physical  Exam  Cardiovascular:      Rate and Rhythm: Normal rate and regular rhythm.      Comments: Per patient  Pulmonary:      Effort: Pulmonary effort is normal.      Breath sounds: Normal breath sounds.      Comments: Per patient  Abdominal:      General: Abdomen is flat. Bowel sounds are normal.      Palpations: Abdomen is soft.      Comments: Per patient   Neurological:      General: No focal deficit present.      Mental Status: She is alert and oriented to person, place, and time.   Psychiatric:         Mood and Affect: Mood normal.         Behavior: Behavior normal.         Thought Content: Thought content normal.         Judgment: Judgment normal.     limited due to telephone call        Assessment:         New goals:   Eat and drink separately with 1 meal/day  Work on decreasing carbonation usage to 3 a week  [place] with  Different protein options / supplements     Darlene Mendieta is a 26 y.o. year old female with medically complicated severe obesity.  , Height 62 inches, weight 288 pounds, BMI 52.68  and weight related problems.    I explained in detail the procedures that we are performing.  All of those procedures can be performed laparoscopically but there is a chance to convert to open if any technical challenges or complications do occur.  Bariatric surgery is not cosmetic surgery but rather a tool to help a patient make a life-long commitment lifestyle changes including diet, exercise, behavior changes, and taking supplemental vitamins and minerals.    Due to the patient's BMI and co-morbidities they are at a high risk for surgery and will obtain the following:  The patient has been advised that a letter of medical support and a history and physical must be obtained from her primary care physician. A psychological evaluation will be arranged for this patient. CBC, CMP, FLP, TSH and HgbA1C will be drawn. Darlene Mendieta will obtain a pre-operative CXR and EKG.    Darlene Mendieta will be set up for a  pre-operative diagnostic esophagogastroduodenoscopy with biopsy for evaluation. The risks and benefits of the procedure were discussed with the patient in detail and all questions were answered.  Possibility of perforation, bleeding, aspiration, anoxic brain injury, respiratory and/or cardiac arrest and death were discussed.   She received handouts regarding, all questions were answered and informed consent was obtained.     The risks, benefits, alternatives, and potential complications of all of the procedures were explained in detail including, but not limited to death, anesthesia and medication adverse effect/DVT, pulmonary embolism, trocar site/incisional hernia, wound infection, abdominal infection, bleeding, failure to lose weight or gain weight and change in body image, metabolic complications with calcium, thiamine, vitamin B12, folate, iron, and anemia.    The patient was advised to start a high protein, low fat and low carbohydrate diet. The patient was given individualized information by our dietician along with general group information and handouts.         The patient was encouraged to start routine exercise including but not limited to 150 minutes per week. The patient received a resistance band along with a handout of exercises.     The consultation plan was reviewed with the patient.    The patient understands the surgical procedures and the different surgical options that are available.  She understands the lifestyle changes that would be required after surgery and has agreed to participate in a pre-operative and postoperative weight management program.  She also expressed understanding of possible risks, had several questions answered and desires to proceed.    I think she is a good candidate for this surgery, and is interested in a sleeve gastrectomy.      Plan:    Patient will have evaluations and follow up with bariatric dieticians and a psychologist before undergoing a multidisciplinary review  of her candidacy.  We also discussed the weight loss requirement and rationale, and other program requirements.    Pt is doing SWL here. Pt will need chest x ray/ EKG/ blood work and EGD prior to surgery. Follow up in 1 month with [facility] for SWL #2.     Total time spent with patient 60 minutes of which 45 minutes were spent on education.       Elyssa Felix, APRN  1/26/2021

## 2021-01-26 NOTE — PROGRESS NOTES
Subjective   Darlene Mendieta is a 26 y.o.y.o. female who presents today for psych eval for bariatric procedure via phone   You have chosen to receive care through a telephone visit. Do you consent to use a telephone visit for your medical care today? Yes    Chief Complaint:    Pre OP Evaluation     History of Present Illness:   The pt has a hx of depression, curently on meds, mood is stable now   Anxiety is manageable     No hx of eating d/o, no binge eating       The pt suffered from excessive weight since childhood, was on depo provera for irregular periods  That caused a lot of weigh gain     This pt had appropriate reasons for seeking bariatric surgery including health issues   The pt also hopes to increase activity level with significant weight loss     The pt reported multiple weight loss attempts including  slim fast , keto, weight watchers, atkins, now on phentermine    The most successful attempt was losing 48 lbs and all past weight loss attempts have only provided temporary relief   The pt denied difficulties perceiving weight loss in the past     Healthy eating habits include 2  meals per day, eggs , yogurt, chicken, lean protein  ,   The pt works night shifts         Currently 288 lbs ,      highest weight  330   lbs      BMI       The pt wants to get sleeve     The following portions of the patient's history were reviewed and updated as appropriate: allergies, current medications, past family history, past medical history, past social history, past surgical history and problem list.    Past Medical History:   Diagnosis Date   • HTN (hypertension)          Social History     Socioeconomic History   • Marital status: Single     Spouse name: Not on file   • Number of children: Not on file   • Years of education: Not on file   • Highest education level: Not on file   Tobacco Use   • Smoking status: Never Smoker   • Smokeless tobacco: Never Used   Substance and Sexual Activity   • Alcohol use: No      Frequency: Never   • Drug use: No   • Sexual activity: Yes   the pt lives with grand mother  No children   Full time employed  No hx of abuse     Family History   Problem Relation Age of Onset   • Cancer Mother    • Other Father    • Multiple sclerosis Father    • Arthritis Maternal Grandmother    • Cancer Maternal Grandfather        No past surgical history on file.    Patient Active Problem List   Diagnosis   • Asthmatic bronchitis   • Carpal tunnel syndrome, bilateral upper limbs   • Depression   • Generalized anxiety disorder   • Hyperinsulinemia   • Morbid obesity (CMS/HCC)   • Social phobia   • Vitamin D deficiency   • HTN (hypertension)   • Reactive hypoglycemia   • Syncope, non cardiac   • Pre-operative examination   • BMI 60.0-69.9, adult (CMS/HCC)         No Known Allergies      Current Outpatient Medications:   •  albuterol sulfate HFA (ProAir HFA) 108 (90 Base) MCG/ACT inhaler, Inhale 2 puffs Every 4 (Four) Hours As Needed for Wheezing for up to 30 days., Disp: 8.5 g, Rfl: 11  •  ALPRAZolam (XANAX) 2 MG tablet, TAKE 1 TABLET BY MOUTH TWICE A DAY, Disp: 60 tablet, Rfl: 1  •  glucose blood (AGAMATRIX PRESTO TEST) test strip, AGAMATRIX PRESTO TEST STRP, Disp: , Rfl:   •  levothyroxine (SYNTHROID, LEVOTHROID) 75 MCG tablet, Take 75 mcg by mouth Daily. on an empty stomach, Disp: , Rfl:   •  phentermine (ADIPEX-P) 37.5 MG tablet, TAKE 1 TABLET BY MOUTH ONCE DAILY *NOT COVERED*, Disp: 30 tablet, Rfl: 2  •  predniSONE (DELTASONE) 20 MG tablet, Take 3 po qd for 4d  then 2 qd for 4d then take 1 qd for 4d  then 1/2 qd for 2d, Disp: 25 tablet, Rfl: 1    PAST PSYCHIATRIC HISTORY  No inpt / no SA    PAST OUTPATIENT TREATMENT  Diagnosis treated:  Anxiety, depression   Treatment Type:  meds   Prior Psychiatric Medications:  prozac - not effective  Buspirone - not effective   Clonazepam - too much sedation   Xanax - now   Support Groups:  None   Sequelae Of Mental Disorder:  Emotional distress       Psychological ROS:  positive for - anxiety  negative for - depression, disorientation, hallucinations, hostility, irritability, memory difficulties, mood swings or obsessive thoughts     Mental Status Exam:    Hygiene:   unable to assess due to phone visit  Cooperation:  Cooperative  Eye Contact:  unable to assess due to phone visit   Psychomotor Behavior:  Appropriate  Affect:  Appropriate  Hopelessness: Denies  Speech:  Normal  Thought Progress:  Goal directed and Linear  Thought Content:  Mood congruent  Suicidal:  None  Homicidal:  None  Hallucinations:  None  Delusion:  None  Memory:  Intact  Orientation:  Person, Place, Time and Situation  Reliability:  good  Insight:  Good  Judgement:  Good  Impulse Control:  Good  Physical/Medical Issues:  No         Never smoker      Diagnoses and all orders for this visit:    1. Morbid obesity (CMS/HCC) (Primary)    2. Pre-operative examination    3. BMI 60.0-69.9, adult (CMS/HCC)         Diagnosis Plan   1. Morbid obesity (CMS/HCC)     2. Pre-operative examination     3. BMI 60.0-69.9, adult (CMS/HCC)           TREATMENT PLAN/GOALS:   No contraindications for bariatric procedure     Continue supportive psychotherapy efforts and medications as indicated. Treatment and medication options discussed during today's visit. Patient ackowledged and verbally consented to continue with current treatment plan and was educated on the importance of compliance with treatment and follow-up appointments.    MEDICATION ISSUES: meds were not prescribed during this visit     No f/u planned   PHQ-9 Depression Screening  Little interest or pleasure in doing things? 1   Feeling down, depressed, or hopeless? 1   Trouble falling or staying asleep, or sleeping too much? 0   Feeling tired or having little energy? 1   Poor appetite or overeating? 0   Feeling bad about yourself - or that you are a failure or have let yourself or your family down? 2   Trouble concentrating on things, such as reading the newspaper or  watching television? 0   Moving or speaking so slowly that other people could have noticed? Or the opposite - being so fidgety or restless that you have been moving around a lot more than usual? 0   Thoughts that you would be better off dead, or of hurting yourself in some way? 0   PHQ-9 Total Score 5   If you checked off any problems, how difficult have these problems made it for you to do your work, take care of things at home, or get along with other people? Somewhat difficult          This visit has been rescheduled as a phone visit to comply with patient safety concerns in accordance with CDC recommendations. Total time of discussion was 24 minutes.    This document has been electronically signed by Caro Vásquez MD  01/26/2021

## 2021-01-27 VITALS — BODY MASS INDEX: 53 KG/M2 | HEIGHT: 62 IN | WEIGHT: 288 LBS

## 2021-01-29 ENCOUNTER — TELEPHONE (OUTPATIENT)
Dept: BARIATRICS/WEIGHT MGMT | Facility: CLINIC | Age: 26
End: 2021-01-29

## 2021-02-08 DIAGNOSIS — F41.1 GENERALIZED ANXIETY DISORDER: ICD-10-CM

## 2021-02-08 RX ORDER — ALPRAZOLAM 2 MG/1
TABLET ORAL
Qty: 60 TABLET | Refills: 1 | Status: SHIPPED | OUTPATIENT
Start: 2021-02-08 | End: 2021-04-15

## 2021-04-12 DIAGNOSIS — F41.1 GENERALIZED ANXIETY DISORDER: ICD-10-CM

## 2021-04-13 ENCOUNTER — PREP FOR SURGERY (OUTPATIENT)
Dept: OTHER | Facility: HOSPITAL | Age: 26
End: 2021-04-13

## 2021-04-14 DIAGNOSIS — F41.1 GENERALIZED ANXIETY DISORDER: ICD-10-CM

## 2021-04-14 NOTE — TELEPHONE ENCOUNTER
Caller: Darlene Mendieta    Relationship: Self    Best call back number: [phone]     Medication needed:   Requested Prescriptions     Pending Prescriptions Disp Refills   • ALPRAZolam (XANAX) 2 MG tablet 60 tablet 1     Sig: Take 1 tablet by mouth 2 (Two) Times a Day.       When do you need the refill by: TODAY    What additional details did the patient provide when requesting the   medication: 4 DAYS OF MEDICATION REMAINING    Does the patient have less than a 3 day supply:  [] Yes  [x] No      What is the patient's preferred pharmacy: [id number] - [place], [place] - [address] [place] [address] - [phone] [facility] [phone] FX

## 2021-04-15 RX ORDER — ALPRAZOLAM 2 MG/1
TABLET ORAL
Qty: 60 TABLET | Refills: 1 | Status: SHIPPED | OUTPATIENT
Start: 2021-04-15 | End: 2021-08-06

## 2021-04-15 RX ORDER — ALPRAZOLAM 2 MG/1
2 TABLET ORAL 2 TIMES DAILY
Qty: 60 TABLET | Refills: 1 | Status: SHIPPED | OUTPATIENT
Start: 2021-04-15 | End: 2023-02-06 | Stop reason: SDUPTHER

## 2021-08-05 DIAGNOSIS — F41.1 GENERALIZED ANXIETY DISORDER: ICD-10-CM

## 2021-08-06 RX ORDER — ALPRAZOLAM 2 MG/1
TABLET ORAL
Qty: 60 TABLET | Refills: 1 | Status: SHIPPED | OUTPATIENT
Start: 2021-08-06 | End: 2021-10-02

## 2021-10-01 DIAGNOSIS — F41.1 GENERALIZED ANXIETY DISORDER: ICD-10-CM

## 2021-10-02 RX ORDER — ALPRAZOLAM 2 MG/1
TABLET ORAL
Qty: 60 TABLET | Refills: 1 | Status: SHIPPED | OUTPATIENT
Start: 2021-10-02 | End: 2021-11-24

## 2021-11-23 DIAGNOSIS — F41.1 GENERALIZED ANXIETY DISORDER: ICD-10-CM

## 2021-11-24 RX ORDER — ALPRAZOLAM 2 MG/1
TABLET ORAL
Qty: 60 TABLET | Refills: 1 | Status: SHIPPED | OUTPATIENT
Start: 2021-11-24 | End: 2022-01-20

## 2022-01-19 DIAGNOSIS — F41.1 GENERALIZED ANXIETY DISORDER: ICD-10-CM

## 2022-01-20 RX ORDER — ALPRAZOLAM 2 MG/1
TABLET ORAL
Qty: 60 TABLET | Refills: 1 | Status: SHIPPED | OUTPATIENT
Start: 2022-01-20 | End: 2022-03-19

## 2022-03-17 DIAGNOSIS — F41.1 GENERALIZED ANXIETY DISORDER: ICD-10-CM

## 2022-03-19 RX ORDER — ALPRAZOLAM 2 MG/1
TABLET ORAL
Qty: 60 TABLET | Refills: 1 | Status: SHIPPED | OUTPATIENT
Start: 2022-03-19 | End: 2022-05-25

## 2022-05-25 DIAGNOSIS — F41.1 GENERALIZED ANXIETY DISORDER: ICD-10-CM

## 2022-05-25 RX ORDER — ALPRAZOLAM 2 MG/1
TABLET ORAL
Qty: 60 TABLET | Refills: 1 | Status: SHIPPED | OUTPATIENT
Start: 2022-05-25 | End: 2022-08-02

## 2022-08-01 DIAGNOSIS — F41.1 GENERALIZED ANXIETY DISORDER: ICD-10-CM

## 2022-08-02 RX ORDER — ALPRAZOLAM 2 MG/1
TABLET ORAL
Qty: 60 TABLET | Refills: 1 | Status: SHIPPED | OUTPATIENT
Start: 2022-08-02 | End: 2022-09-22

## 2022-09-21 DIAGNOSIS — F41.1 GENERALIZED ANXIETY DISORDER: ICD-10-CM

## 2022-09-22 RX ORDER — ALPRAZOLAM 2 MG/1
TABLET ORAL
Qty: 60 TABLET | Refills: 1 | Status: SHIPPED | OUTPATIENT
Start: 2022-09-22 | End: 2022-12-01

## 2022-12-01 DIAGNOSIS — F41.1 GENERALIZED ANXIETY DISORDER: ICD-10-CM

## 2022-12-01 RX ORDER — ALPRAZOLAM 2 MG/1
TABLET ORAL
Qty: 60 TABLET | Refills: 1 | Status: SHIPPED | OUTPATIENT
Start: 2022-12-01 | End: 2023-01-30

## 2023-01-30 DIAGNOSIS — F41.1 GENERALIZED ANXIETY DISORDER: ICD-10-CM

## 2023-01-30 RX ORDER — ALPRAZOLAM 2 MG/1
TABLET ORAL
Qty: 60 TABLET | Refills: 1 | Status: SHIPPED | OUTPATIENT
Start: 2023-01-30 | End: 2023-04-07

## 2023-02-06 ENCOUNTER — OFFICE VISIT (OUTPATIENT)
Dept: FAMILY MEDICINE CLINIC | Facility: CLINIC | Age: 28
End: 2023-02-06
Payer: COMMERCIAL

## 2023-02-06 ENCOUNTER — LAB (OUTPATIENT)
Dept: FAMILY MEDICINE CLINIC | Facility: CLINIC | Age: 28
End: 2023-02-06
Payer: COMMERCIAL

## 2023-02-06 VITALS
BODY MASS INDEX: 52.68 KG/M2 | HEART RATE: 80 BPM | SYSTOLIC BLOOD PRESSURE: 110 MMHG | HEIGHT: 62 IN | RESPIRATION RATE: 16 BRPM | DIASTOLIC BLOOD PRESSURE: 80 MMHG

## 2023-02-06 DIAGNOSIS — E66.01 MORBID OBESITY: Primary | ICD-10-CM

## 2023-02-06 DIAGNOSIS — E11.649 TYPE 2 DIABETES MELLITUS WITH HYPOGLYCEMIA WITHOUT COMA, WITHOUT LONG-TERM CURRENT USE OF INSULIN: ICD-10-CM

## 2023-02-06 DIAGNOSIS — F33.42 RECURRENT MAJOR DEPRESSIVE DISORDER, IN FULL REMISSION: ICD-10-CM

## 2023-02-06 DIAGNOSIS — I10 PRIMARY HYPERTENSION: ICD-10-CM

## 2023-02-06 DIAGNOSIS — F41.1 GENERALIZED ANXIETY DISORDER: ICD-10-CM

## 2023-02-06 DIAGNOSIS — E16.1 HYPERINSULINEMIA: ICD-10-CM

## 2023-02-06 LAB
25(OH)D3 SERPL-MCNC: 21.8 NG/ML (ref 30–100)
ALBUMIN SERPL-MCNC: 4.5 G/DL (ref 3.5–5.2)
ALBUMIN/GLOB SERPL: 1.5 G/DL
ALP SERPL-CCNC: 55 U/L (ref 39–117)
ALT SERPL W P-5'-P-CCNC: 12 U/L (ref 1–33)
ANION GAP SERPL CALCULATED.3IONS-SCNC: 9.1 MMOL/L (ref 5–15)
AST SERPL-CCNC: 13 U/L (ref 1–32)
BACTERIA UR QL AUTO: ABNORMAL /HPF
BASOPHILS # BLD AUTO: 0.04 10*3/MM3 (ref 0–0.2)
BASOPHILS NFR BLD AUTO: 0.5 % (ref 0–1.5)
BILIRUB SERPL-MCNC: 0.4 MG/DL (ref 0–1.2)
BILIRUB UR QL STRIP: NEGATIVE
BUN SERPL-MCNC: 7 MG/DL (ref 6–20)
BUN/CREAT SERPL: 9.1 (ref 7–25)
CALCIUM SPEC-SCNC: 9.4 MG/DL (ref 8.6–10.5)
CHLORIDE SERPL-SCNC: 107 MMOL/L (ref 98–107)
CHOLEST SERPL-MCNC: 153 MG/DL (ref 0–200)
CLARITY UR: ABNORMAL
CO2 SERPL-SCNC: 26.9 MMOL/L (ref 22–29)
COLOR UR: YELLOW
CREAT SERPL-MCNC: 0.77 MG/DL (ref 0.57–1)
DEPRECATED RDW RBC AUTO: 43.4 FL (ref 37–54)
EGFRCR SERPLBLD CKD-EPI 2021: 107.9 ML/MIN/1.73
EOSINOPHIL # BLD AUTO: 0.21 10*3/MM3 (ref 0–0.4)
EOSINOPHIL NFR BLD AUTO: 2.5 % (ref 0.3–6.2)
ERYTHROCYTE [DISTWIDTH] IN BLOOD BY AUTOMATED COUNT: 12.8 % (ref 12.3–15.4)
GLOBULIN UR ELPH-MCNC: 3 GM/DL
GLUCOSE SERPL-MCNC: 86 MG/DL (ref 65–99)
GLUCOSE UR STRIP-MCNC: NEGATIVE MG/DL
HBA1C MFR BLD: 4.1 % (ref 3.5–5.6)
HCT VFR BLD AUTO: 41.8 % (ref 34–46.6)
HDLC SERPL-MCNC: 33 MG/DL (ref 40–60)
HGB BLD-MCNC: 13.9 G/DL (ref 12–15.9)
HGB UR QL STRIP.AUTO: NEGATIVE
HYALINE CASTS UR QL AUTO: ABNORMAL /LPF
IMM GRANULOCYTES # BLD AUTO: 0.02 10*3/MM3 (ref 0–0.05)
IMM GRANULOCYTES NFR BLD AUTO: 0.2 % (ref 0–0.5)
KETONES UR QL STRIP: NEGATIVE
LDLC SERPL CALC-MCNC: 96 MG/DL (ref 0–100)
LDLC/HDLC SERPL: 2.84 {RATIO}
LEUKOCYTE ESTERASE UR QL STRIP.AUTO: ABNORMAL
LYMPHOCYTES # BLD AUTO: 2.39 10*3/MM3 (ref 0.7–3.1)
LYMPHOCYTES NFR BLD AUTO: 28.8 % (ref 19.6–45.3)
MCH RBC QN AUTO: 30.8 PG (ref 26.6–33)
MCHC RBC AUTO-ENTMCNC: 33.3 G/DL (ref 31.5–35.7)
MCV RBC AUTO: 92.5 FL (ref 79–97)
MONOCYTES # BLD AUTO: 0.6 10*3/MM3 (ref 0.1–0.9)
MONOCYTES NFR BLD AUTO: 7.2 % (ref 5–12)
NEUTROPHILS NFR BLD AUTO: 5.04 10*3/MM3 (ref 1.7–7)
NEUTROPHILS NFR BLD AUTO: 60.8 % (ref 42.7–76)
NITRITE UR QL STRIP: NEGATIVE
NRBC BLD AUTO-RTO: 0 /100 WBC (ref 0–0.2)
PH UR STRIP.AUTO: 6 [PH] (ref 5–8)
PLATELET # BLD AUTO: 261 10*3/MM3 (ref 140–450)
PMV BLD AUTO: 11.3 FL (ref 6–12)
POTASSIUM SERPL-SCNC: 4 MMOL/L (ref 3.5–5.2)
PROT SERPL-MCNC: 7.5 G/DL (ref 6–8.5)
PROT UR QL STRIP: NEGATIVE
RBC # BLD AUTO: 4.52 10*6/MM3 (ref 3.77–5.28)
RBC # UR STRIP: ABNORMAL /HPF
REF LAB TEST METHOD: ABNORMAL
SODIUM SERPL-SCNC: 143 MMOL/L (ref 136–145)
SP GR UR STRIP: 1.01 (ref 1–1.03)
SQUAMOUS #/AREA URNS HPF: ABNORMAL /HPF
T4 FREE SERPL-MCNC: 0.89 NG/DL (ref 0.93–1.7)
TRIGL SERPL-MCNC: 131 MG/DL (ref 0–150)
TSH SERPL DL<=0.05 MIU/L-ACNC: 2.34 UIU/ML (ref 0.27–4.2)
UROBILINOGEN UR QL STRIP: ABNORMAL
VIT B12 BLD-MCNC: 243 PG/ML (ref 211–946)
VLDLC SERPL-MCNC: 24 MG/DL (ref 5–40)
WBC # UR STRIP: ABNORMAL /HPF
WBC NRBC COR # BLD: 8.3 10*3/MM3 (ref 3.4–10.8)

## 2023-02-06 PROCEDURE — 81001 URINALYSIS AUTO W/SCOPE: CPT | Performed by: FAMILY MEDICINE

## 2023-02-06 PROCEDURE — 83036 HEMOGLOBIN GLYCOSYLATED A1C: CPT | Performed by: FAMILY MEDICINE

## 2023-02-06 PROCEDURE — 80061 LIPID PANEL: CPT | Performed by: FAMILY MEDICINE

## 2023-02-06 PROCEDURE — 80050 GENERAL HEALTH PANEL: CPT | Performed by: FAMILY MEDICINE

## 2023-02-06 PROCEDURE — 82306 VITAMIN D 25 HYDROXY: CPT | Performed by: FAMILY MEDICINE

## 2023-02-06 PROCEDURE — 84439 ASSAY OF FREE THYROXINE: CPT | Performed by: FAMILY MEDICINE

## 2023-02-06 PROCEDURE — 99214 OFFICE O/P EST MOD 30 MIN: CPT | Performed by: FAMILY MEDICINE

## 2023-02-06 PROCEDURE — 36415 COLL VENOUS BLD VENIPUNCTURE: CPT | Performed by: FAMILY MEDICINE

## 2023-02-06 PROCEDURE — 82607 VITAMIN B-12: CPT | Performed by: FAMILY MEDICINE

## 2023-02-06 RX ORDER — MELOXICAM 15 MG/1
TABLET ORAL
COMMUNITY

## 2023-02-06 RX ORDER — ERGOCALCIFEROL 1.25 MG/1
50000 CAPSULE ORAL
Qty: 12 CAPSULE | Refills: 1 | Status: SHIPPED | OUTPATIENT
Start: 2023-02-06 | End: 2023-04-25

## 2023-02-06 RX ORDER — CYCLOBENZAPRINE HCL 10 MG
TABLET ORAL
COMMUNITY

## 2023-02-06 RX ORDER — ALBUTEROL SULFATE 1.25 MG/3ML
1 SOLUTION RESPIRATORY (INHALATION) EVERY 6 HOURS PRN
Qty: 180 ML | Refills: 12 | Status: SHIPPED | OUTPATIENT
Start: 2023-02-06 | End: 2023-03-08

## 2023-02-06 NOTE — PROGRESS NOTES
"Chief Complaint  Hypothyroidism and Anxiety    Subjective      Darlene Mendieta presents to [facility]  History of Present Illness    The patient presents today for a followup.    The patient states that she is doing well. She was last seen in 03/2020. She would like to switch to a new medication. She needs refills now that she has insurance.    The patient has been taking phentermine for 3 months and feels like it is not working anymore. She was 310 pounds in 03/2020 and her heaviest weight was 360 pounds. She is 225 pounds as of this morning. She states that she left a very toxic relationship and is \"super watching\" what she eats. She has tried intermittent fasting and has been going to the gym. She went way overboard on her carbs hoping that it would at least make her gain and then make her lose weight. She states that she would be happy to be 145 to 150 pounds. She states that she was referred to a general surgeon, but she did not go to the general surgeon because she wanted to do it all by herself.    The patient's blood glucose is running an average of 75 and her blood pressure has been approximately 110/80 mmHg at home. She has been a lot better health wise since losing all of her weight. She was on metformin for a while, but she took herself off of it because she was having really bad side effects.    The patient's asthma has kicked up a little bit, she thinks because of the weather. She has been using her inhaler and sometimes using her breathing machine. She does not have the refills on that. She has never been prescribed them. She has been using the nebulizer solution but she does not have any of those vials left. She still has her ProAir inhaler in her work bag.      Objective   Vital Signs:  /80 (BP Location: Right arm, Cuff Size: Large Adult)   Pulse 80   Resp 16   Ht 157.5 cm (62\")   BMI 52.68 kg/m²   Estimated body mass index is 52.68 kg/m² as calculated " "from the following:    Height as of this encounter: 157.5 cm (62\").    Weight as of 1/27/21: 131 kg (288 lb).             Physical Exam  Constitutional:       Appearance: Normal appearance. She is well-developed. She is obese.   HENT:      Head: Normocephalic and atraumatic.      Right Ear: Tympanic membrane, ear canal and external ear normal.      Left Ear: Tympanic membrane, ear canal and external ear normal.      Nose: Nose normal.      Mouth/Throat:      Mouth: Mucous membranes are moist.      Pharynx: Oropharynx is clear. No oropharyngeal exudate.   Eyes:      Extraocular Movements: Extraocular movements intact.      Conjunctiva/sclera: Conjunctivae normal.      Pupils: Pupils are equal, round, and reactive to light.   Cardiovascular:      Rate and Rhythm: Normal rate and regular rhythm.      Pulses: Normal pulses.      Heart sounds: Normal heart sounds.   Pulmonary:      Effort: Pulmonary effort is normal.      Breath sounds: Normal breath sounds.   Abdominal:      General: Bowel sounds are normal.      Palpations: Abdomen is soft.      Comments: Morbid central obesity   Musculoskeletal:         General: Normal range of motion.      Cervical back: Normal range of motion and neck supple.      Comments: All 4 extremities are obese   Skin:     General: Skin is warm and dry.   Neurological:      General: No focal deficit present.      Mental Status: She is alert and oriented to person, place, and time. Mental status is at baseline.   Psychiatric:         Mood and Affect: Mood normal.         Behavior: Behavior normal.         Thought Content: Thought content normal.         Judgment: Judgment normal.          Assessment and Plan     1. Obesity  - The patient is a 28-year-old white female who has suffered with  obesity for many years. She comes in today after losing more weight. The patient has lost almost 100 pounds over the past few years and she is currently down to 288 pounds. Her goal is 150 pounds, so she has " a way to go yet, but she is working hard at getting there. She has been exercising, following a low carb diet, and taking phentermine. The phentermine seems to have quit working for her. She does have type 2 diabetes, although it has improved since she lost the weight. She has not really checked her blood glucose levels or her A1c recently. We are going to go ahead and do that today, but I am going to ask her insurance company to cover Ozempic or something in the GLP-1 category if at all possible. We will see if we can get her some help there and I think she will be able to lose the rest of the weight if she can have that help available. She is willing to try this and stop the phentermine.    2. Type 2 diabetes  - The patient has not had her blood glucose checked for some time. When she has randomly checked them at home, they have been reasonably good. We need to do the A1c today and then see what her random blood glucose levels are looking like.    3. Primary hypertension  - The patient has a history of hypertension. She is currently not taking any medications for her blood pressure, but she has been managing it with just diet and exercise. We will continue that for now as long as we are getting good readings. If her weight goes back up, you can anticipate her blood pressure we will go with it.    4. Hyperinsulinemia  - The patient has been diagnosed with high insulin levels in the past, but we have not checked them recently. She certainly has all the hallmarks of it with hypertension in the past, overweight, and loss of control of her blood glucose levels. I think the GLP-1's would be a great option for her.    5. Generalized anxiety  - The patient has always had a high level of anxiety and at times deep depression. Fortunately, she just got out of her relationship that was very toxic and her mood has gone up tremendously. That relationship was 10 years old and needed to be ended long ago. She finally got  encouraged to do it and she has felt great since and lost weight since. She is continuing to work full-time and will hopefully see continued improvement.    5. Recurrent major depression  - The patient has had depression in the past based on her relationships that as mentioned above were quite toxic. She has gotten out of that relationship now and seems to be in a much better mood. Her current medications only involve Xanax 2 mg that she uses on an as needed basis. She usually takes a half a or a half half of the tablet during the day if needed and at bedtime, she will take a half to one depending on what she needs to sleep. Recently, she has not needed near as much of the alprazolam as she needed years ago. She is exercising and doing all the other healthy changes that she needs for the situation she finds herself in.         Follow Up Return in about 6 months (around 8/6/2023), or if symptoms worsen or fail to improve, for Recheck.  Patient was given instructions and counseling regarding her condition or for health maintenance advice. Please see specific information pulled into the AVS if appropriate.     Transcribed from ambient dictation for Peyman Moulton MD by Medina Hernandez.  02/06/23   10:23 EST    Patient or patient representative verbalized consent to the visit recording.  I have personally performed the services described in this document as transcribed by the above individual, and it is both accurate and complete.  Peyman Moulton MD  2/12/2023  08:12 EST

## 2023-02-07 ENCOUNTER — TELEPHONE (OUTPATIENT)
Dept: FAMILY MEDICINE CLINIC | Facility: CLINIC | Age: 28
End: 2023-02-07
Payer: COMMERCIAL

## 2023-02-07 RX ORDER — SEMAGLUTIDE 1.34 MG/ML
0.25 INJECTION, SOLUTION SUBCUTANEOUS WEEKLY
Qty: 1.5 ML | Refills: 2 | Status: SHIPPED | OUTPATIENT
Start: 2023-02-07 | End: 2023-02-08 | Stop reason: SDUPTHER

## 2023-02-07 NOTE — TELEPHONE ENCOUNTER
MARISELA told the patient yesterday he was sending in Ozempic.  It doesn't look like it went thru.  Please send in to [email or web address] in [place].

## 2023-02-08 DIAGNOSIS — E03.9 ACQUIRED HYPOTHYROIDISM: Primary | ICD-10-CM

## 2023-02-08 RX ORDER — SEMAGLUTIDE 1.34 MG/ML
0.25 INJECTION, SOLUTION SUBCUTANEOUS WEEKLY
Qty: 1.5 ML | Refills: 2 | Status: SHIPPED | OUTPATIENT
Start: 2023-02-08

## 2023-02-08 NOTE — TELEPHONE ENCOUNTER
It went thru at 2:27. It is generic on list.  I will send again just in case sure scripts failed.

## 2023-04-05 DIAGNOSIS — F41.1 GENERALIZED ANXIETY DISORDER: ICD-10-CM

## 2023-04-07 RX ORDER — ALPRAZOLAM 2 MG/1
TABLET ORAL
Qty: 60 TABLET | Refills: 1 | Status: SHIPPED | OUTPATIENT
Start: 2023-04-07

## 2023-04-13 ENCOUNTER — TELEPHONE (OUTPATIENT)
Dept: FAMILY MEDICINE CLINIC | Facility: CLINIC | Age: 28
End: 2023-04-13
Payer: COMMERCIAL

## 2023-04-14 NOTE — TELEPHONE ENCOUNTER
Tried calling patient at 9:36am and got no answer and voice mail not set up.    Sent patient a [employer] message to call the office to schedule a [facility] lab appt.

## 2023-06-04 DIAGNOSIS — F41.1 GENERALIZED ANXIETY DISORDER: ICD-10-CM

## 2023-06-05 RX ORDER — ALPRAZOLAM 2 MG/1
TABLET ORAL
Qty: 60 TABLET | Refills: 1 | Status: SHIPPED | OUTPATIENT
Start: 2023-06-05

## 2023-07-28 DIAGNOSIS — F41.1 GENERALIZED ANXIETY DISORDER: ICD-10-CM

## 2023-07-28 RX ORDER — ALPRAZOLAM 2 MG/1
TABLET ORAL
Qty: 60 TABLET | Refills: 1 | Status: SHIPPED | OUTPATIENT
Start: 2023-07-28

## 2023-09-28 DIAGNOSIS — F41.1 GENERALIZED ANXIETY DISORDER: ICD-10-CM

## 2023-09-29 RX ORDER — ALPRAZOLAM 2 MG/1
TABLET ORAL
Qty: 60 TABLET | Refills: 1 | Status: SHIPPED | OUTPATIENT
Start: 2023-09-29

## 2023-10-01 PROBLEM — E11.649 TYPE 2 DIABETES MELLITUS WITH HYPOGLYCEMIA WITHOUT COMA, WITHOUT LONG-TERM CURRENT USE OF INSULIN: Status: RESOLVED | Noted: 2023-02-06 | Resolved: 2023-10-01

## 2023-12-01 DIAGNOSIS — F41.1 GENERALIZED ANXIETY DISORDER: ICD-10-CM

## 2023-12-02 RX ORDER — ALPRAZOLAM 2 MG/1
TABLET ORAL
Qty: 60 TABLET | Refills: 1 | Status: SHIPPED | OUTPATIENT
Start: 2023-12-02

## 2023-12-05 DIAGNOSIS — F41.1 GENERALIZED ANXIETY DISORDER: ICD-10-CM

## 2023-12-05 RX ORDER — ALPRAZOLAM 2 MG/1
TABLET ORAL
Qty: 60 TABLET | Refills: 1 | Status: SHIPPED | OUTPATIENT
Start: 2023-12-05

## 2024-02-09 DIAGNOSIS — F41.1 GENERALIZED ANXIETY DISORDER: ICD-10-CM

## 2024-02-09 NOTE — TELEPHONE ENCOUNTER
Tried calling patient at 1:20pm and the call would never go thru.  I sent patient a [employer] message asking her to call the office to schedule an appt with [facility] within the next 90 days to continue receiving medication from our office.    Dr VALLEJO - Will you send this rx in?

## 2024-02-10 RX ORDER — ALPRAZOLAM 2 MG/1
TABLET ORAL
Qty: 60 TABLET | Refills: 1 | Status: SHIPPED | OUTPATIENT
Start: 2024-02-10

## 2025-06-28 NOTE — TELEPHONE ENCOUNTER
Tried to call no answer no voice mail   chest pain and left arm pain x 4 days. was discharged 2 days ago for pain crisis but chest pain is worsening. denies fever. last Oxycodone at 4:30pm, last Motrin @8:30pm. complains 7/10 pain. pt awake and alert, easy wob noted. NKDA, pmh: sickle cell, vutd